# Patient Record
Sex: MALE | Race: WHITE | NOT HISPANIC OR LATINO
[De-identification: names, ages, dates, MRNs, and addresses within clinical notes are randomized per-mention and may not be internally consistent; named-entity substitution may affect disease eponyms.]

---

## 2017-01-10 ENCOUNTER — APPOINTMENT (OUTPATIENT)
Dept: INTERNAL MEDICINE | Facility: CLINIC | Age: 62
End: 2017-01-10

## 2017-01-10 VITALS
HEART RATE: 78 BPM | SYSTOLIC BLOOD PRESSURE: 100 MMHG | OXYGEN SATURATION: 98 % | BODY MASS INDEX: 28.49 KG/M2 | HEIGHT: 73 IN | WEIGHT: 215 LBS | DIASTOLIC BLOOD PRESSURE: 70 MMHG | TEMPERATURE: 98.1 F

## 2017-01-10 DIAGNOSIS — Z98.890 OTHER SPECIFIED POSTPROCEDURAL STATES: ICD-10-CM

## 2017-01-10 DIAGNOSIS — Z87.01 PERSONAL HISTORY OF PNEUMONIA (RECURRENT): ICD-10-CM

## 2017-01-10 DIAGNOSIS — R68.89 OTHER GENERAL SYMPTOMS AND SIGNS: ICD-10-CM

## 2017-01-21 ENCOUNTER — APPOINTMENT (OUTPATIENT)
Dept: RADIOLOGY | Facility: CLINIC | Age: 62
End: 2017-01-21

## 2017-01-21 ENCOUNTER — OUTPATIENT (OUTPATIENT)
Dept: OUTPATIENT SERVICES | Facility: HOSPITAL | Age: 62
LOS: 1 days | End: 2017-01-21
Payer: COMMERCIAL

## 2017-01-21 DIAGNOSIS — Z87.01 PERSONAL HISTORY OF PNEUMONIA (RECURRENT): ICD-10-CM

## 2017-01-21 DIAGNOSIS — Z98.52 VASECTOMY STATUS: Chronic | ICD-10-CM

## 2017-01-21 PROCEDURE — 71046 X-RAY EXAM CHEST 2 VIEWS: CPT

## 2017-02-06 ENCOUNTER — APPOINTMENT (OUTPATIENT)
Dept: OTOLARYNGOLOGY | Facility: CLINIC | Age: 62
End: 2017-02-06

## 2017-02-06 VITALS
HEIGHT: 73 IN | BODY MASS INDEX: 27.83 KG/M2 | DIASTOLIC BLOOD PRESSURE: 76 MMHG | HEART RATE: 64 BPM | WEIGHT: 210 LBS | SYSTOLIC BLOOD PRESSURE: 134 MMHG

## 2017-02-06 DIAGNOSIS — R09.82 POSTNASAL DRIP: ICD-10-CM

## 2017-02-06 DIAGNOSIS — J34.3 HYPERTROPHY OF NASAL TURBINATES: ICD-10-CM

## 2017-02-06 DIAGNOSIS — R05 COUGH: ICD-10-CM

## 2017-03-31 ENCOUNTER — NON-APPOINTMENT (OUTPATIENT)
Age: 62
End: 2017-03-31

## 2017-03-31 ENCOUNTER — APPOINTMENT (OUTPATIENT)
Dept: INTERNAL MEDICINE | Facility: CLINIC | Age: 62
End: 2017-03-31

## 2017-03-31 VITALS
HEIGHT: 73 IN | OXYGEN SATURATION: 98 % | SYSTOLIC BLOOD PRESSURE: 120 MMHG | HEART RATE: 67 BPM | TEMPERATURE: 98.3 F | BODY MASS INDEX: 28.49 KG/M2 | DIASTOLIC BLOOD PRESSURE: 70 MMHG | WEIGHT: 215 LBS

## 2017-03-31 DIAGNOSIS — R74.0 NONSPECIFIC ELEVATION OF LEVELS OF TRANSAMINASE AND LACTIC ACID DEHYDROGENASE [LDH]: ICD-10-CM

## 2017-03-31 DIAGNOSIS — Z23 ENCOUNTER FOR IMMUNIZATION: ICD-10-CM

## 2017-04-03 LAB
ALBUMIN SERPL ELPH-MCNC: 4.5 G/DL
ALP BLD-CCNC: 60 U/L
ALT SERPL-CCNC: 42 U/L
ANION GAP SERPL CALC-SCNC: 15 MMOL/L
APPEARANCE: CLEAR
AST SERPL-CCNC: 26 U/L
BASOPHILS # BLD AUTO: 0.03 K/UL
BASOPHILS NFR BLD AUTO: 0.5 %
BILIRUB SERPL-MCNC: 0.6 MG/DL
BILIRUBIN URINE: NEGATIVE
BLOOD URINE: NEGATIVE
BUN SERPL-MCNC: 15 MG/DL
CALCIUM SERPL-MCNC: 9.7 MG/DL
CHLORIDE SERPL-SCNC: 104 MMOL/L
CHOLEST SERPL-MCNC: 256 MG/DL
CHOLEST/HDLC SERPL: 8 RATIO
CO2 SERPL-SCNC: 24 MMOL/L
COLOR: YELLOW
CREAT SERPL-MCNC: 1.12 MG/DL
EOSINOPHIL # BLD AUTO: 0.13 K/UL
EOSINOPHIL NFR BLD AUTO: 2.2 %
GLUCOSE QUALITATIVE U: NORMAL MG/DL
GLUCOSE SERPL-MCNC: 91 MG/DL
HBA1C MFR BLD HPLC: 5.7 %
HCT VFR BLD CALC: 42.3 %
HDLC SERPL-MCNC: 32 MG/DL
HGB BLD-MCNC: 14.6 G/DL
IMM GRANULOCYTES NFR BLD AUTO: 0.3 %
KETONES URINE: NEGATIVE
LDLC SERPL CALC-MCNC: 165 MG/DL
LEUKOCYTE ESTERASE URINE: NEGATIVE
LYMPHOCYTES # BLD AUTO: 2.11 K/UL
LYMPHOCYTES NFR BLD AUTO: 34.9 %
MAN DIFF?: NORMAL
MCHC RBC-ENTMCNC: 30.9 PG
MCHC RBC-ENTMCNC: 34.5 GM/DL
MCV RBC AUTO: 89.4 FL
MONOCYTES # BLD AUTO: 0.48 K/UL
MONOCYTES NFR BLD AUTO: 7.9 %
NEUTROPHILS # BLD AUTO: 3.27 K/UL
NEUTROPHILS NFR BLD AUTO: 54.2 %
NITRITE URINE: NEGATIVE
PH URINE: 6
PLATELET # BLD AUTO: 203 K/UL
POTASSIUM SERPL-SCNC: 4.3 MMOL/L
PROT SERPL-MCNC: 7.5 G/DL
PROTEIN URINE: NEGATIVE MG/DL
PSA SERPL-MCNC: 0.48 NG/ML
RBC # BLD: 4.73 M/UL
RBC # FLD: 12.6 %
SODIUM SERPL-SCNC: 143 MMOL/L
SPECIFIC GRAVITY URINE: 1.02
T4 FREE SERPL-MCNC: 1 NG/DL
TRIGL SERPL-MCNC: 293 MG/DL
TSH SERPL-ACNC: 1.43 UIU/ML
UROBILINOGEN URINE: NORMAL MG/DL
WBC # FLD AUTO: 6.04 K/UL

## 2017-09-12 ENCOUNTER — RESULT REVIEW (OUTPATIENT)
Age: 62
End: 2017-09-12

## 2017-09-12 ENCOUNTER — LABORATORY RESULT (OUTPATIENT)
Age: 62
End: 2017-09-12

## 2017-09-12 ENCOUNTER — APPOINTMENT (OUTPATIENT)
Dept: DERMATOLOGY | Facility: CLINIC | Age: 62
End: 2017-09-12
Payer: COMMERCIAL

## 2017-09-12 VITALS — SYSTOLIC BLOOD PRESSURE: 112 MMHG | DIASTOLIC BLOOD PRESSURE: 68 MMHG

## 2017-09-12 DIAGNOSIS — L81.4 OTHER MELANIN HYPERPIGMENTATION: ICD-10-CM

## 2017-09-12 DIAGNOSIS — B07.9 VIRAL WART, UNSPECIFIED: ICD-10-CM

## 2017-09-12 PROCEDURE — 11100 BX SKIN SUBCUTANEOUS&/MUCOUS MEMBRANE 1 LESION: CPT | Mod: 59,GC

## 2017-09-12 PROCEDURE — 99213 OFFICE O/P EST LOW 20 MIN: CPT | Mod: 25

## 2017-09-12 PROCEDURE — 17110 DESTRUCTION B9 LES UP TO 14: CPT | Mod: GC

## 2017-09-15 ENCOUNTER — OTHER (OUTPATIENT)
Age: 62
End: 2017-09-15

## 2018-03-19 ENCOUNTER — NON-APPOINTMENT (OUTPATIENT)
Age: 63
End: 2018-03-19

## 2018-03-19 ENCOUNTER — APPOINTMENT (OUTPATIENT)
Dept: INTERNAL MEDICINE | Facility: CLINIC | Age: 63
End: 2018-03-19
Payer: COMMERCIAL

## 2018-03-19 ENCOUNTER — TRANSCRIPTION ENCOUNTER (OUTPATIENT)
Age: 63
End: 2018-03-19

## 2018-03-19 VITALS
HEART RATE: 70 BPM | TEMPERATURE: 98.1 F | HEIGHT: 71 IN | SYSTOLIC BLOOD PRESSURE: 100 MMHG | BODY MASS INDEX: 30.1 KG/M2 | WEIGHT: 215 LBS | OXYGEN SATURATION: 98 % | DIASTOLIC BLOOD PRESSURE: 70 MMHG

## 2018-03-19 PROCEDURE — 93000 ELECTROCARDIOGRAM COMPLETE: CPT

## 2018-03-19 PROCEDURE — 36415 COLL VENOUS BLD VENIPUNCTURE: CPT

## 2018-03-19 PROCEDURE — 99396 PREV VISIT EST AGE 40-64: CPT | Mod: 25

## 2018-03-20 ENCOUNTER — TRANSCRIPTION ENCOUNTER (OUTPATIENT)
Age: 63
End: 2018-03-20

## 2018-03-20 LAB
ALBUMIN SERPL ELPH-MCNC: 4.7 G/DL
ALP BLD-CCNC: 61 U/L
ALT SERPL-CCNC: 32 U/L
ANION GAP SERPL CALC-SCNC: 14 MMOL/L
APPEARANCE: CLEAR
AST SERPL-CCNC: 34 U/L
BASOPHILS # BLD AUTO: 0.02 K/UL
BASOPHILS NFR BLD AUTO: 0.3 %
BILIRUB SERPL-MCNC: 0.8 MG/DL
BILIRUBIN URINE: NEGATIVE
BLOOD URINE: NEGATIVE
BUN SERPL-MCNC: 19 MG/DL
CALCIUM SERPL-MCNC: 9.9 MG/DL
CHLORIDE SERPL-SCNC: 104 MMOL/L
CHOLEST SERPL-MCNC: 242 MG/DL
CHOLEST/HDLC SERPL: 6.1 RATIO
CO2 SERPL-SCNC: 23 MMOL/L
COLOR: YELLOW
CREAT SERPL-MCNC: 1.23 MG/DL
EOSINOPHIL # BLD AUTO: 0.11 K/UL
EOSINOPHIL NFR BLD AUTO: 1.9 %
GLUCOSE QUALITATIVE U: NEGATIVE MG/DL
GLUCOSE SERPL-MCNC: 98 MG/DL
HBA1C MFR BLD HPLC: 5.4 %
HCT VFR BLD CALC: 41.6 %
HDLC SERPL-MCNC: 40 MG/DL
HGB BLD-MCNC: 14.3 G/DL
IMM GRANULOCYTES NFR BLD AUTO: 0 %
KETONES URINE: NEGATIVE
LDLC SERPL CALC-MCNC: 170 MG/DL
LEUKOCYTE ESTERASE URINE: NEGATIVE
LYMPHOCYTES # BLD AUTO: 1.76 K/UL
LYMPHOCYTES NFR BLD AUTO: 30.6 %
MAN DIFF?: NORMAL
MCHC RBC-ENTMCNC: 32.1 PG
MCHC RBC-ENTMCNC: 34.4 GM/DL
MCV RBC AUTO: 93.3 FL
MONOCYTES # BLD AUTO: 0.54 K/UL
MONOCYTES NFR BLD AUTO: 9.4 %
NEUTROPHILS # BLD AUTO: 3.33 K/UL
NEUTROPHILS NFR BLD AUTO: 57.8 %
NITRITE URINE: NEGATIVE
PH URINE: 6
PLATELET # BLD AUTO: 195 K/UL
POTASSIUM SERPL-SCNC: 4.3 MMOL/L
PROT SERPL-MCNC: 7.8 G/DL
PROTEIN URINE: NEGATIVE MG/DL
PSA SERPL-MCNC: 0.47 NG/ML
RBC # BLD: 4.46 M/UL
RBC # FLD: 12.6 %
SODIUM SERPL-SCNC: 141 MMOL/L
SPECIFIC GRAVITY URINE: 1.02
T4 FREE SERPL-MCNC: 1 NG/DL
TRIGL SERPL-MCNC: 161 MG/DL
TSH SERPL-ACNC: 1.47 UIU/ML
UROBILINOGEN URINE: NEGATIVE MG/DL
WBC # FLD AUTO: 5.76 K/UL

## 2018-03-21 ENCOUNTER — TRANSCRIPTION ENCOUNTER (OUTPATIENT)
Age: 63
End: 2018-03-21

## 2018-08-01 ENCOUNTER — TRANSCRIPTION ENCOUNTER (OUTPATIENT)
Age: 63
End: 2018-08-01

## 2018-08-13 ENCOUNTER — APPOINTMENT (OUTPATIENT)
Dept: INTERNAL MEDICINE | Facility: CLINIC | Age: 63
End: 2018-08-13
Payer: COMMERCIAL

## 2018-08-13 VITALS
WEIGHT: 198 LBS | HEART RATE: 60 BPM | OXYGEN SATURATION: 99 % | HEIGHT: 71 IN | BODY MASS INDEX: 27.72 KG/M2 | SYSTOLIC BLOOD PRESSURE: 100 MMHG | DIASTOLIC BLOOD PRESSURE: 70 MMHG | TEMPERATURE: 98.2 F

## 2018-08-13 DIAGNOSIS — R31.9 HEMATURIA, UNSPECIFIED: ICD-10-CM

## 2018-08-13 PROCEDURE — 36415 COLL VENOUS BLD VENIPUNCTURE: CPT

## 2018-08-13 PROCEDURE — 99214 OFFICE O/P EST MOD 30 MIN: CPT | Mod: 25

## 2018-08-13 RX ORDER — METHYLPREDNISOLONE 4 MG/1
4 TABLET ORAL
Qty: 1 | Refills: 0 | Status: DISCONTINUED | COMMUNITY
Start: 2017-02-06 | End: 2018-08-13

## 2018-08-13 NOTE — PHYSICAL EXAM
[No Acute Distress] : no acute distress [Well-Appearing] : well-appearing [Normal Voice/Communication] : normal voice/communication [Soft] : abdomen soft [Non Tender] : non-tender [Non-distended] : non-distended [No HSM] : no HSM [Normal Bowel Sounds] : normal bowel sounds [No CVA Tenderness] : no CVA  tenderness [Grossly Normal Strength/Tone] : grossly normal strength/tone [Normal Gait] : normal gait [Normal Affect] : the affect was normal [Normal Mood] : the mood was normal [Normal Insight/Judgement] : insight and judgment were intact

## 2018-08-13 NOTE — HISTORY OF PRESENT ILLNESS
[de-identified] : presents for f/u visit after episode of brief gross hematuria , followed by difficulty passing urine briefly one morning, gross hematuria , then he passed what he thought could be a stone in his urine. he had no significant pain prior to this. no recurrence of any urinary symptoms over past 1-2 weeks, feels well, no flank pain. he recalls a history of incidental small kidney stones found on an abdominal US 10 years ago, but never had an issue until now. he feels well currently, w no abd pain or urinary complaints. \par \par hyperlipidemia on statin with no adverse effects, due for recheck of labs. he has been exercising and continues to lose weight ,feels well

## 2018-08-13 NOTE — REVIEW OF SYSTEMS
[Negative] : Heme/Lymph [Dysuria] : no dysuria [Incontinence] : no incontinence [Hesitancy] : no hesitancy [Hematuria] : no hematuria [Frequency] : no frequency

## 2018-08-13 NOTE — ASSESSMENT
[FreeTextEntry1] : discussed w pt \par reviewed his episode of gross hematuria and likely passing of a small stone few weeks ago, he is aware of prior asymptomatic stones on imaging, from years ago, found incidentally. \par advised increase oral fluids\par check UA, culture, urine cytology as precaution\par check renal US to further evaluate if any stones present \par consider urology eval if any recurrent symptoms. he declines for now \par \par repeat labs as below for lipid panel , COMP, cont statin \par \par RTO few weeks for f/u after above studies or call earlier prn if any new concerns

## 2018-08-14 LAB
APPEARANCE: CLEAR
BACTERIA: NEGATIVE
BILIRUBIN URINE: NEGATIVE
BLOOD URINE: NEGATIVE
COLOR: YELLOW
GLUCOSE QUALITATIVE U: NEGATIVE MG/DL
KETONES URINE: NEGATIVE
LEUKOCYTE ESTERASE URINE: NEGATIVE
MICROSCOPIC-UA: NORMAL
NITRITE URINE: NEGATIVE
PH URINE: 5.5
PROTEIN URINE: NEGATIVE MG/DL
RED BLOOD CELLS URINE: 0 /HPF
SPECIFIC GRAVITY URINE: 1.02
SQUAMOUS EPITHELIAL CELLS: 0 /HPF
UROBILINOGEN URINE: NEGATIVE MG/DL
WHITE BLOOD CELLS URINE: 0 /HPF

## 2018-08-16 LAB
ALBUMIN SERPL ELPH-MCNC: 4.9 G/DL
ALP BLD-CCNC: 57 U/L
ALT SERPL-CCNC: 27 U/L
ANION GAP SERPL CALC-SCNC: 13 MMOL/L
AST SERPL-CCNC: 35 U/L
BACTERIA UR CULT: NORMAL
BILIRUB SERPL-MCNC: 0.7 MG/DL
BUN SERPL-MCNC: 15 MG/DL
CALCIUM SERPL-MCNC: 9.8 MG/DL
CHLORIDE SERPL-SCNC: 104 MMOL/L
CHOLEST SERPL-MCNC: 137 MG/DL
CHOLEST/HDLC SERPL: 3 RATIO
CO2 SERPL-SCNC: 25 MMOL/L
CORE LAB FLUID CYTOLOGY: NORMAL
CREAT SERPL-MCNC: 0.96 MG/DL
GLUCOSE SERPL-MCNC: 89 MG/DL
HDLC SERPL-MCNC: 46 MG/DL
LDLC SERPL CALC-MCNC: 77 MG/DL
POTASSIUM SERPL-SCNC: 4 MMOL/L
PROT SERPL-MCNC: 7.6 G/DL
SODIUM SERPL-SCNC: 142 MMOL/L
TRIGL SERPL-MCNC: 72 MG/DL

## 2018-12-04 ENCOUNTER — RX RENEWAL (OUTPATIENT)
Age: 63
End: 2018-12-04

## 2019-03-11 ENCOUNTER — TRANSCRIPTION ENCOUNTER (OUTPATIENT)
Age: 64
End: 2019-03-11

## 2019-03-27 ENCOUNTER — NON-APPOINTMENT (OUTPATIENT)
Age: 64
End: 2019-03-27

## 2019-03-27 ENCOUNTER — APPOINTMENT (OUTPATIENT)
Dept: INTERNAL MEDICINE | Facility: CLINIC | Age: 64
End: 2019-03-27
Payer: COMMERCIAL

## 2019-03-27 ENCOUNTER — LABORATORY RESULT (OUTPATIENT)
Age: 64
End: 2019-03-27

## 2019-03-27 VITALS
HEART RATE: 64 BPM | OXYGEN SATURATION: 99 % | SYSTOLIC BLOOD PRESSURE: 110 MMHG | DIASTOLIC BLOOD PRESSURE: 60 MMHG | TEMPERATURE: 98.7 F | WEIGHT: 194 LBS | HEIGHT: 71 IN | BODY MASS INDEX: 27.16 KG/M2

## 2019-03-27 DIAGNOSIS — Z23 ENCOUNTER FOR IMMUNIZATION: ICD-10-CM

## 2019-03-27 DIAGNOSIS — H61.23 IMPACTED CERUMEN, BILATERAL: ICD-10-CM

## 2019-03-27 PROCEDURE — 99396 PREV VISIT EST AGE 40-64: CPT | Mod: 25

## 2019-03-27 PROCEDURE — 90471 IMMUNIZATION ADMIN: CPT

## 2019-03-27 PROCEDURE — 36415 COLL VENOUS BLD VENIPUNCTURE: CPT

## 2019-03-27 PROCEDURE — 90715 TDAP VACCINE 7 YRS/> IM: CPT

## 2019-03-27 PROCEDURE — G0444 DEPRESSION SCREEN ANNUAL: CPT | Mod: 25

## 2019-03-27 PROCEDURE — 93000 ELECTROCARDIOGRAM COMPLETE: CPT

## 2019-03-27 NOTE — HEALTH RISK ASSESSMENT
[Good] : ~his/her~ current health as good [Patient reported colonoscopy was normal] : Patient reported colonoscopy was normal [None] : None [] :  [] : No [No falls in past year] : Patient reported no falls in the past year [0] : 2) Feeling down, depressed, or hopeless: Not at all (0) [ColonoscopyDate] : 03/14 [ColonoscopyComments] : - as per pt recollection, was not able to obtain records

## 2019-03-27 NOTE — REVIEW OF SYSTEMS
[Negative] : Heme/Lymph [Mole Changes] : no mole changes [de-identified] : b/l inner ear dermatitis unchanged

## 2019-03-27 NOTE — HISTORY OF PRESENT ILLNESS
[FreeTextEntry1] : \par 62 y/o man presents for annual physical exam. he feels well currently w no new concerns. exercising regularly, playing basketball, lost more weight, diet improved. \par \par hyperlipidemia nicely improved on low dose statin \par \par follows w dermatology yearly for multiple skin lesions, no skin cancer detected  \par \par had 3 prior colonoscopies in his 50s at the direction of his PCP/GI Dr Blake, all normal as per pt and last done in ~2014

## 2019-03-27 NOTE — PHYSICAL EXAM
[No Acute Distress] : no acute distress [Well Nourished] : well nourished [Well Developed] : well developed [Well-Appearing] : well-appearing [Normal Sclera/Conjunctiva] : normal sclera/conjunctiva [PERRL] : pupils equal round and reactive to light [EOMI] : extraocular movements intact [Normal Outer Ear/Nose] : the outer ears and nose were normal in appearance [Normal Oropharynx] : the oropharynx was normal [No JVD] : no jugular venous distention [Supple] : supple [No Lymphadenopathy] : no lymphadenopathy [Thyroid Normal, No Nodules] : the thyroid was normal and there were no nodules present [No Respiratory Distress] : no respiratory distress  [Clear to Auscultation] : lungs were clear to auscultation bilaterally [No Accessory Muscle Use] : no accessory muscle use [Normal Rate] : normal rate  [Regular Rhythm] : with a regular rhythm [Normal S1, S2] : normal S1 and S2 [No Murmur] : no murmur heard [No Carotid Bruits] : no carotid bruits [No Abdominal Bruit] : a ~M bruit was not heard ~T in the abdomen [No Varicosities] : no varicosities [Pedal Pulses Present] : the pedal pulses are present [No Edema] : there was no peripheral edema [No Extremity Clubbing/Cyanosis] : no extremity clubbing/cyanosis [No Palpable Aorta] : no palpable aorta [Soft] : abdomen soft [Non Tender] : non-tender [Non-distended] : non-distended [No Masses] : no abdominal mass palpated [No HSM] : no HSM [Normal Bowel Sounds] : normal bowel sounds [Normal Sphincter Tone] : normal sphincter tone [No Mass] : no mass [Normal Supraclavicular Nodes] : no supraclavicular lymphadenopathy [Normal Posterior Cervical Nodes] : no posterior cervical lymphadenopathy [Normal Anterior Cervical Nodes] : no anterior cervical lymphadenopathy [No CVA Tenderness] : no CVA  tenderness [No Spinal Tenderness] : no spinal tenderness [No Joint Swelling] : no joint swelling [Grossly Normal Strength/Tone] : grossly normal strength/tone [No Rash] : no rash [Normal Gait] : normal gait [Coordination Grossly Intact] : coordination grossly intact [No Focal Deficits] : no focal deficits [Deep Tendon Reflexes (DTR)] : deep tendon reflexes were 2+ and symmetric [Normal Affect] : the affect was normal [Normal Mood] : the mood was normal [Normal Insight/Judgement] : insight and judgment were intact [Speech Grossly Normal] : speech grossly normal [de-identified] : b/l cerumen impaction  [de-identified] : b/l ear dermatitis mild, multiple skin lesions on back, chest unchanged

## 2019-03-27 NOTE — ASSESSMENT
[FreeTextEntry1] : discussed w pt \par \par check routine labs as below\par \par cont statin , lipids much improved thus far \par \par cont diet control, exercise, weight control \par \par dermatology f/u yearly \par \par he is UTD w screening colonoscopy, last done 4-5 years ago , consider repeat next year \par \par Tdap discussed and administered today. consider Shingrix vaccine in the future \par \par RTO 6 months for routine f/u or earlier prn if any new concerns  \par

## 2019-04-08 LAB
ALBUMIN SERPL ELPH-MCNC: 5.1 G/DL
ALP BLD-CCNC: 63 U/L
ALT SERPL-CCNC: 35 U/L
ANION GAP SERPL CALC-SCNC: 12 MMOL/L
APPEARANCE: CLEAR
AST SERPL-CCNC: 25 U/L
BASOPHILS # BLD AUTO: 0.02 K/UL
BASOPHILS NFR BLD AUTO: 0.3 %
BILIRUB SERPL-MCNC: 1 MG/DL
BILIRUBIN URINE: NEGATIVE
BLOOD URINE: ABNORMAL
BUN SERPL-MCNC: 19 MG/DL
CALCIUM SERPL-MCNC: 10 MG/DL
CHLORIDE SERPL-SCNC: 103 MMOL/L
CHOLEST SERPL-MCNC: 151 MG/DL
CHOLEST/HDLC SERPL: 3.8 RATIO
CO2 SERPL-SCNC: 27 MMOL/L
COLOR: YELLOW
CREAT SERPL-MCNC: 1.14 MG/DL
EOSINOPHIL # BLD AUTO: 0.06 K/UL
EOSINOPHIL NFR BLD AUTO: 0.9 %
ESTIMATED AVERAGE GLUCOSE: 108 MG/DL
GLUCOSE QUALITATIVE U: NEGATIVE
GLUCOSE SERPL-MCNC: 101 MG/DL
HBA1C MFR BLD HPLC: 5.4 %
HCT VFR BLD CALC: 41.4 %
HDLC SERPL-MCNC: 40 MG/DL
HGB BLD-MCNC: 13.9 G/DL
IMM GRANULOCYTES NFR BLD AUTO: 0.3 %
KETONES URINE: NEGATIVE
LDLC SERPL CALC-MCNC: 87 MG/DL
LEUKOCYTE ESTERASE URINE: NEGATIVE
LYMPHOCYTES # BLD AUTO: 1.74 K/UL
LYMPHOCYTES NFR BLD AUTO: 25.5 %
MAN DIFF?: NORMAL
MCHC RBC-ENTMCNC: 30.6 PG
MCHC RBC-ENTMCNC: 33.6 GM/DL
MCV RBC AUTO: 91.2 FL
MONOCYTES # BLD AUTO: 0.54 K/UL
MONOCYTES NFR BLD AUTO: 7.9 %
NEUTROPHILS # BLD AUTO: 4.45 K/UL
NEUTROPHILS NFR BLD AUTO: 65.1 %
NITRITE URINE: NEGATIVE
PH URINE: 6
PLATELET # BLD AUTO: 198 K/UL
POTASSIUM SERPL-SCNC: 4.8 MMOL/L
PROT SERPL-MCNC: 7.4 G/DL
PROTEIN URINE: NEGATIVE
PSA SERPL-MCNC: 0.59 NG/ML
RBC # BLD: 4.54 M/UL
RBC # FLD: 12.1 %
SODIUM SERPL-SCNC: 142 MMOL/L
SPECIFIC GRAVITY URINE: 1.02
T4 FREE SERPL-MCNC: 1 NG/DL
TRIGL SERPL-MCNC: 122 MG/DL
TSH SERPL-ACNC: 1.34 UIU/ML
UROBILINOGEN URINE: NORMAL
WBC # FLD AUTO: 6.83 K/UL

## 2019-05-13 ENCOUNTER — TRANSCRIPTION ENCOUNTER (OUTPATIENT)
Age: 64
End: 2019-05-13

## 2019-05-23 ENCOUNTER — APPOINTMENT (OUTPATIENT)
Dept: DERMATOLOGY | Facility: CLINIC | Age: 64
End: 2019-05-23
Payer: COMMERCIAL

## 2019-05-23 VITALS — SYSTOLIC BLOOD PRESSURE: 130 MMHG | DIASTOLIC BLOOD PRESSURE: 60 MMHG

## 2019-05-23 DIAGNOSIS — D17.1 BENIGN LIPOMATOUS NEOPLASM OF SKIN AND SUBCUTANEOUS TISSUE OF TRUNK: ICD-10-CM

## 2019-05-23 PROCEDURE — 99214 OFFICE O/P EST MOD 30 MIN: CPT

## 2019-06-10 ENCOUNTER — APPOINTMENT (OUTPATIENT)
Dept: DERMATOLOGY | Facility: CLINIC | Age: 64
End: 2019-06-10

## 2019-07-02 ENCOUNTER — RX RENEWAL (OUTPATIENT)
Age: 64
End: 2019-07-02

## 2019-07-05 ENCOUNTER — RX RENEWAL (OUTPATIENT)
Age: 64
End: 2019-07-05

## 2019-08-08 ENCOUNTER — RX RENEWAL (OUTPATIENT)
Age: 64
End: 2019-08-08

## 2019-12-24 ENCOUNTER — TRANSCRIPTION ENCOUNTER (OUTPATIENT)
Age: 64
End: 2019-12-24

## 2019-12-24 ENCOUNTER — MEDICATION RENEWAL (OUTPATIENT)
Age: 64
End: 2019-12-24

## 2020-01-08 ENCOUNTER — TRANSCRIPTION ENCOUNTER (OUTPATIENT)
Age: 65
End: 2020-01-08

## 2020-04-01 ENCOUNTER — APPOINTMENT (OUTPATIENT)
Dept: INTERNAL MEDICINE | Facility: CLINIC | Age: 65
End: 2020-04-01

## 2020-05-28 ENCOUNTER — APPOINTMENT (OUTPATIENT)
Dept: INTERNAL MEDICINE | Facility: CLINIC | Age: 65
End: 2020-05-28
Payer: COMMERCIAL

## 2020-05-28 ENCOUNTER — NON-APPOINTMENT (OUTPATIENT)
Age: 65
End: 2020-05-28

## 2020-05-28 VITALS
DIASTOLIC BLOOD PRESSURE: 65 MMHG | HEART RATE: 86 BPM | OXYGEN SATURATION: 97 % | SYSTOLIC BLOOD PRESSURE: 130 MMHG | BODY MASS INDEX: 27.72 KG/M2 | TEMPERATURE: 98.2 F | WEIGHT: 198 LBS | HEIGHT: 71 IN

## 2020-05-28 DIAGNOSIS — Z11.59 ENCOUNTER FOR SCREENING FOR OTHER VIRAL DISEASES: ICD-10-CM

## 2020-05-28 PROCEDURE — 90750 HZV VACC RECOMBINANT IM: CPT

## 2020-05-28 PROCEDURE — 93000 ELECTROCARDIOGRAM COMPLETE: CPT | Mod: 59

## 2020-05-28 PROCEDURE — 36415 COLL VENOUS BLD VENIPUNCTURE: CPT

## 2020-05-28 PROCEDURE — G0444 DEPRESSION SCREEN ANNUAL: CPT

## 2020-05-28 PROCEDURE — 99396 PREV VISIT EST AGE 40-64: CPT | Mod: 25

## 2020-05-28 PROCEDURE — 90471 IMMUNIZATION ADMIN: CPT

## 2020-05-28 NOTE — PHYSICAL EXAM
[No Acute Distress] : no acute distress [Well Nourished] : well nourished [Well Developed] : well developed [Well-Appearing] : well-appearing [Normal Voice/Communication] : normal voice/communication [Normal Sclera/Conjunctiva] : normal sclera/conjunctiva [PERRL] : pupils equal round and reactive to light [Normal Outer Ear/Nose] : the outer ears and nose were normal in appearance [Normal Oropharynx] : the oropharynx was normal [Normal TMs] : both tympanic membranes were normal [No JVD] : no jugular venous distention [Supple] : supple [No Lymphadenopathy] : no lymphadenopathy [Thyroid Normal, No Nodules] : the thyroid was normal and there were no nodules present [No Respiratory Distress] : no respiratory distress  [Clear to Auscultation] : lungs were clear to auscultation bilaterally [No Accessory Muscle Use] : no accessory muscle use [Normal Rate] : normal rate  [Regular Rhythm] : with a regular rhythm [Normal S1, S2] : normal S1 and S2 [No Murmur] : no murmur heard [No Carotid Bruits] : no carotid bruits [No Abdominal Bruit] : a ~M bruit was not heard ~T in the abdomen [No Varicosities] : no varicosities [Pedal Pulses Present] : the pedal pulses are present [No Edema] : there was no peripheral edema [No Extremity Clubbing/Cyanosis] : no extremity clubbing/cyanosis [No Palpable Aorta] : no palpable aorta [Soft] : abdomen soft [Non-distended] : non-distended [Non Tender] : non-tender [No HSM] : no HSM [No Masses] : no abdominal mass palpated [Normal Sphincter Tone] : normal sphincter tone [Normal Bowel Sounds] : normal bowel sounds [No Mass] : no mass [Prostate Enlargement] : the prostate was not enlarged [No Prostate Nodules] : no prostate nodules [Prostate Tenderness] : the prostate was not tender [Normal Posterior Cervical Nodes] : no posterior cervical lymphadenopathy [Normal Supraclavicular Nodes] : no supraclavicular lymphadenopathy [No CVA Tenderness] : no CVA  tenderness [No Spinal Tenderness] : no spinal tenderness [Normal Anterior Cervical Nodes] : no anterior cervical lymphadenopathy [No Joint Swelling] : no joint swelling [Grossly Normal Strength/Tone] : grossly normal strength/tone [No Rash] : no rash [Normal Gait] : normal gait [Coordination Grossly Intact] : coordination grossly intact [No Focal Deficits] : no focal deficits [Deep Tendon Reflexes (DTR)] : deep tendon reflexes were 2+ and symmetric [Speech Grossly Normal] : speech grossly normal [Normal Affect] : the affect was normal [Normal Insight/Judgement] : insight and judgment were intact [Normal Mood] : the mood was normal [de-identified] : b/l ear dermatitis mild, multiple skin lesions on back, chest unchanged  [de-identified] : minimal b/l cerumen, improved

## 2020-05-28 NOTE — HISTORY OF PRESENT ILLNESS
[FreeTextEntry1] : \par 65 y/o man presents for annual physical exam. he feels well currently w no new concerns. has not been able to exercise oftenor play basketball due to current COVID19 pandemic, but he is trying to remain active.\par \par hyperlipidemia well controlled on low dose statin \par \par follows w dermatology yearly for multiple skin lesions, no skin cancer detected  \par \par he is due for repeat screening colonoscopy. had 3 prior colonoscopies in his 50s at the direction of his PCP/GI Dr Blake, all normal as per pt and last done in ~2014

## 2020-05-28 NOTE — REVIEW OF SYSTEMS
[Mole Changes] : no mole changes [Negative] : Heme/Lymph [de-identified] : b/l inner ear dermatitis unchanged

## 2020-05-28 NOTE — HEALTH RISK ASSESSMENT
[] : No [No] : No [No falls in past year] : Patient reported no falls in the past year [0] : 2) Feeling down, depressed, or hopeless: Not at all (0) [Patient reported colonoscopy was normal] : Patient reported colonoscopy was normal [None] : None [] :  [ColonoscopyDate] : 03/14 [ColonoscopyComments] : - as per pt recollection, was not able to obtain records

## 2020-05-28 NOTE — ASSESSMENT
[FreeTextEntry1] : discussed w pt \par \par check routine labs as below\par \par cont statin , monitor lipids , improved \par \par cont diet control, exercise, weight control \par \par dermatology f/u yearly \par \par due for screening colonoscopy, referred to GI \par \par discussed Shingrix vaccine in detail, first dose administered today \par \par he mentions concern re 'curved penis' for past 5-6 months. no pain or problem w urination or ejaculation, but sometimes interferes w sexual intercourse. no discharge or rash. reviewed and advised this may represent Peyronies disease. advised urology consult to review potential tx options, he will make appt \par \par RTO yearly for routine exam or earlier prn if any new concerns  \par

## 2020-06-16 LAB
ALBUMIN SERPL ELPH-MCNC: 5.3 G/DL
ALP BLD-CCNC: 69 U/L
ALT SERPL-CCNC: 28 U/L
ANION GAP SERPL CALC-SCNC: 13 MMOL/L
APPEARANCE: CLEAR
AST SERPL-CCNC: 25 U/L
BASOPHILS # BLD AUTO: 0.02 K/UL
BASOPHILS NFR BLD AUTO: 0.3 %
BILIRUB SERPL-MCNC: 0.8 MG/DL
BILIRUBIN URINE: NEGATIVE
BLOOD URINE: NEGATIVE
BUN SERPL-MCNC: 18 MG/DL
CALCIUM SERPL-MCNC: 10.1 MG/DL
CHLORIDE SERPL-SCNC: 103 MMOL/L
CHOLEST SERPL-MCNC: 212 MG/DL
CHOLEST/HDLC SERPL: 5.6 RATIO
CO2 SERPL-SCNC: 28 MMOL/L
COLOR: YELLOW
CREAT SERPL-MCNC: 1.14 MG/DL
EOSINOPHIL # BLD AUTO: 0.08 K/UL
EOSINOPHIL NFR BLD AUTO: 1 %
ESTIMATED AVERAGE GLUCOSE: 111 MG/DL
GLUCOSE QUALITATIVE U: NEGATIVE
GLUCOSE SERPL-MCNC: 104 MG/DL
HBA1C MFR BLD HPLC: 5.5 %
HCT VFR BLD CALC: 43.7 %
HDLC SERPL-MCNC: 38 MG/DL
HGB BLD-MCNC: 14.7 G/DL
IMM GRANULOCYTES NFR BLD AUTO: 0.3 %
KETONES URINE: NEGATIVE
LDLC SERPL CALC-MCNC: 109 MG/DL
LEUKOCYTE ESTERASE URINE: NEGATIVE
LYMPHOCYTES # BLD AUTO: 2.16 K/UL
LYMPHOCYTES NFR BLD AUTO: 28.3 %
MAN DIFF?: NORMAL
MCHC RBC-ENTMCNC: 30.9 PG
MCHC RBC-ENTMCNC: 33.6 GM/DL
MCV RBC AUTO: 91.8 FL
MONOCYTES # BLD AUTO: 0.59 K/UL
MONOCYTES NFR BLD AUTO: 7.7 %
NEUTROPHILS # BLD AUTO: 4.76 K/UL
NEUTROPHILS NFR BLD AUTO: 62.4 %
NITRITE URINE: NEGATIVE
PH URINE: 5.5
PLATELET # BLD AUTO: 204 K/UL
POTASSIUM SERPL-SCNC: 4.4 MMOL/L
PROT SERPL-MCNC: 7.9 G/DL
PROTEIN URINE: NEGATIVE
PSA SERPL-MCNC: 0.66 NG/ML
RBC # BLD: 4.76 M/UL
RBC # FLD: 12 %
SARS-COV-2 IGG SERPL IA-ACNC: <0.1 INDEX
SARS-COV-2 IGG SERPL QL IA: NEGATIVE
SODIUM SERPL-SCNC: 144 MMOL/L
SPECIFIC GRAVITY URINE: 1.02
T4 FREE SERPL-MCNC: 1 NG/DL
TRIGL SERPL-MCNC: 325 MG/DL
TSH SERPL-ACNC: 2.15 UIU/ML
UROBILINOGEN URINE: NORMAL
WBC # FLD AUTO: 7.63 K/UL

## 2020-06-25 ENCOUNTER — APPOINTMENT (OUTPATIENT)
Dept: GASTROENTEROLOGY | Facility: CLINIC | Age: 65
End: 2020-06-25
Payer: COMMERCIAL

## 2020-06-25 VITALS
HEIGHT: 71 IN | WEIGHT: 206 LBS | OXYGEN SATURATION: 96 % | SYSTOLIC BLOOD PRESSURE: 120 MMHG | BODY MASS INDEX: 28.84 KG/M2 | HEART RATE: 70 BPM | DIASTOLIC BLOOD PRESSURE: 69 MMHG

## 2020-06-25 DIAGNOSIS — Z12.12 ENCOUNTER FOR SCREENING FOR MALIGNANT NEOPLASM OF COLON: ICD-10-CM

## 2020-06-25 DIAGNOSIS — Z12.11 ENCOUNTER FOR SCREENING FOR MALIGNANT NEOPLASM OF COLON: ICD-10-CM

## 2020-06-25 PROCEDURE — 99214 OFFICE O/P EST MOD 30 MIN: CPT

## 2020-06-25 NOTE — PHYSICAL EXAM
[General Appearance - Alert] : alert [General Appearance - In No Acute Distress] : in no acute distress [Sclera] : the sclera and conjunctiva were normal [PERRL With Normal Accommodation] : pupils were equal in size, round, and reactive to light [Oropharynx] : the oropharynx was normal [Outer Ear] : the ears and nose were normal in appearance [Extraocular Movements] : extraocular movements were intact [Neck Appearance] : the appearance of the neck was normal [Jugular Venous Distention Increased] : there was no jugular-venous distention [Neck Cervical Mass (___cm)] : no neck mass was observed [Thyroid Diffuse Enlargement] : the thyroid was not enlarged [Thyroid Nodule] : there were no palpable thyroid nodules [Heart Rate And Rhythm] : heart rate was normal and rhythm regular [Auscultation Breath Sounds / Voice Sounds] : lungs were clear to auscultation bilaterally [Heart Sounds Gallop] : no gallops [Heart Sounds] : normal S1 and S2 [Murmurs] : no murmurs [Bowel Sounds] : normal bowel sounds [Edema] : there was no peripheral edema [Heart Sounds Pericardial Friction Rub] : no pericardial rub [Abdomen Soft] : soft [Abdomen Tenderness] : non-tender [Cervical Lymph Nodes Enlarged Posterior Bilaterally] : posterior cervical [Cervical Lymph Nodes Enlarged Anterior Bilaterally] : anterior cervical [Abdomen Mass (___ Cm)] : no abdominal mass palpated [No Spinal Tenderness] : no spinal tenderness [No CVA Tenderness] : no ~M costovertebral angle tenderness [Abnormal Walk] : normal gait [Musculoskeletal - Swelling] : no joint swelling seen [Nail Clubbing] : no clubbing  or cyanosis of the fingernails [Motor Tone] : muscle strength and tone were normal [Skin Color & Pigmentation] : normal skin color and pigmentation [Skin Turgor] : normal skin turgor [] : no rash [Oriented To Time, Place, And Person] : oriented to person, place, and time [No Focal Deficits] : no focal deficits [Affect] : the affect was normal [Impaired Insight] : insight and judgment were intact

## 2020-06-25 NOTE — ASSESSMENT
[FreeTextEntry1] : Pt is due for a screening colonoscopy.\par \par I have asked the patient to schedule a colonoscopy in the near future. I have reviewed the risks benefits and alternatives and provided the patient literature to read.  I have emphasized the need to have a good clean out including adequate fluid intake and avoiding seeds for one week prior to the procedure.

## 2020-06-25 NOTE — HISTORY OF PRESENT ILLNESS
[de-identified] : The patient moves bowels daily. Denies any abdominal pain, constipation, diarrhea, bright red blood per rectum. Weight is stable.\par \par No heartburn, odynophagia, dysphagia or early satiety.\par \par No history of anemia or abnormal liver enzymes.\par \par Due for screening colonoscopy.

## 2020-09-13 DIAGNOSIS — Z01.818 ENCOUNTER FOR OTHER PREPROCEDURAL EXAMINATION: ICD-10-CM

## 2020-09-15 ENCOUNTER — APPOINTMENT (OUTPATIENT)
Dept: DISASTER EMERGENCY | Facility: CLINIC | Age: 65
End: 2020-09-15

## 2020-09-15 LAB — SARS-COV-2 N GENE NPH QL NAA+PROBE: NOT DETECTED

## 2020-09-17 ENCOUNTER — TRANSCRIPTION ENCOUNTER (OUTPATIENT)
Age: 65
End: 2020-09-17

## 2020-09-18 ENCOUNTER — APPOINTMENT (OUTPATIENT)
Dept: GASTROENTEROLOGY | Facility: HOSPITAL | Age: 65
End: 2020-09-18

## 2020-09-18 ENCOUNTER — OUTPATIENT (OUTPATIENT)
Dept: OUTPATIENT SERVICES | Facility: HOSPITAL | Age: 65
LOS: 1 days | End: 2020-09-18
Payer: COMMERCIAL

## 2020-09-18 ENCOUNTER — RESULT REVIEW (OUTPATIENT)
Age: 65
End: 2020-09-18

## 2020-09-18 ENCOUNTER — APPOINTMENT (OUTPATIENT)
Dept: INTERNAL MEDICINE | Facility: CLINIC | Age: 65
End: 2020-09-18
Payer: COMMERCIAL

## 2020-09-18 DIAGNOSIS — Z23 ENCOUNTER FOR IMMUNIZATION: ICD-10-CM

## 2020-09-18 DIAGNOSIS — Z12.11 ENCOUNTER FOR SCREENING FOR MALIGNANT NEOPLASM OF COLON: ICD-10-CM

## 2020-09-18 DIAGNOSIS — Z98.52 VASECTOMY STATUS: Chronic | ICD-10-CM

## 2020-09-18 PROCEDURE — 88305 TISSUE EXAM BY PATHOLOGIST: CPT | Mod: 26

## 2020-09-18 PROCEDURE — 45380 COLONOSCOPY AND BIOPSY: CPT | Mod: XS,PT

## 2020-09-18 PROCEDURE — 88305 TISSUE EXAM BY PATHOLOGIST: CPT

## 2020-09-18 PROCEDURE — 45385 COLONOSCOPY W/LESION REMOVAL: CPT | Mod: 33

## 2020-09-18 PROCEDURE — 45385 COLONOSCOPY W/LESION REMOVAL: CPT | Mod: PT

## 2020-09-18 PROCEDURE — 90471 IMMUNIZATION ADMIN: CPT

## 2020-09-18 PROCEDURE — 90750 HZV VACC RECOMBINANT IM: CPT

## 2020-09-18 PROCEDURE — 45380 COLONOSCOPY AND BIOPSY: CPT | Mod: 59

## 2020-09-21 LAB — SURGICAL PATHOLOGY STUDY: SIGNIFICANT CHANGE UP

## 2020-10-14 ENCOUNTER — APPOINTMENT (OUTPATIENT)
Dept: UROLOGY | Facility: CLINIC | Age: 65
End: 2020-10-14
Payer: COMMERCIAL

## 2020-10-14 VITALS
HEART RATE: 70 BPM | OXYGEN SATURATION: 98 % | TEMPERATURE: 97.8 F | HEIGHT: 71 IN | WEIGHT: 200 LBS | DIASTOLIC BLOOD PRESSURE: 82 MMHG | BODY MASS INDEX: 28 KG/M2 | SYSTOLIC BLOOD PRESSURE: 132 MMHG

## 2020-10-14 DIAGNOSIS — R39.198 OTHER DIFFICULTIES WITH MICTURITION: ICD-10-CM

## 2020-10-14 PROCEDURE — 99203 OFFICE O/P NEW LOW 30 MIN: CPT | Mod: 25

## 2020-10-14 PROCEDURE — 51798 US URINE CAPACITY MEASURE: CPT

## 2020-10-14 NOTE — PHYSICAL EXAM
[General Appearance - Well Developed] : well developed [Edema] : no peripheral edema [Exaggerated Use Of Accessory Muscles For Inspiration] : no accessory muscle use [] : no hepato-splenomegaly [Abdomen Tenderness] : non-tender [Abdomen Mass (___ Cm)] : no abdominal mass palpated [Abdomen Hernia] : no hernia was discovered [Costovertebral Angle Tenderness] : no ~M costovertebral angle tenderness [Urethral Meatus] : meatus normal [Penis Abnormality] : normal circumcised penis [Epididymis] : the epididymides were normal [Testes Tenderness] : no tenderness of the testes [Testes Mass (___cm)] : there were no testicular masses [Prostate Tenderness] : the prostate was not tender [Prostate Enlargement] : the prostate was not enlarged [Normal Station and Gait] : the gait and station were normal for the patient's age [Skin Color & Pigmentation] : normal skin color and pigmentation [Motor Exam] : the motor exam was normal [Not Anxious] : not anxious [Inguinal Lymph Nodes Enlarged Bilaterally] : inguinal [FreeTextEntry1] : 11 cm;  ? plaque on left side; PVR 22

## 2020-10-14 NOTE — HISTORY OF PRESENT ILLNESS
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 penile curvature\par 2. decreased strength of urinary stream\par \par Patient notes curvature to the left which has been stable x 2 years\par Does no cause erectile dysfunction\par Causes wife discomfort\par wife utilizes lubrication \par \par Urinary symptoms mild nocturia and decreased strength of uriination\par \par  [Urinary Incontinence] : no urinary incontinence [Urinary Frequency] : no urinary frequency [Straining] : no straining [Intermittency] : no intermittency

## 2020-10-14 NOTE — REVIEW OF SYSTEMS
[Poor quality erections] : Poor quality erections [Wake up at night to urinate  How many times?  ___] : wakes up to urinate [unfilled] times during the night [Slow urine stream] : slow urine stream [Negative] : Heme/Lymph

## 2020-10-14 NOTE — ASSESSMENT
[FreeTextEntry1] : Mr. Rosenbaum is a 65-year-old gentleman.  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  This appears to be stable.  It causes him no erectile dysfunction.  However, he notes his wife does complain of pain with sexual intercourse.  She does have difficulty with lubrication however with artificial lubrication she continues to have discomfort.  She states that "it is sitting in a different spot" this causes her discomfort.  They have attempted different positions without the resolution of this complaint.  On examination his penis is 11 cm in length.  There is mild induration on the left side consistent with possible Peyronie's disease.  He has no history of trauma.  He takes no medications associated with Peyronie's disease.  He has no evidence of Dupuytren's contractures in his palms.\par Discussed Peyronies disease; pathology; natural history and treatment options.\par Patient has symptomatic new lesion without curvature.\par Discussed oral medication and lack of proven efficacy.\par Discussed ILV (intralesional verpamil)\par Discussed xiaflex\par Discussed proceeding with DUS \par \par Patient also complains of mild urinary symptoms including nocturia 1-2 times per night and decreased urinary stream.  We discussed the utilization of alpha blockers he is interested in medical management.\par Pt understands that some of the most common side effect  of this medication include dizziness, dry mouth, fatigue, lightheadedness, palpitations. Pt informed to stop the medication should any of these occur.  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

## 2020-10-15 LAB
APPEARANCE: CLEAR
BACTERIA: NEGATIVE
BILIRUBIN URINE: NEGATIVE
BLOOD URINE: NEGATIVE
COLOR: YELLOW
GLUCOSE QUALITATIVE U: NEGATIVE
HYALINE CASTS: 0 /LPF
KETONES URINE: NEGATIVE
LEUKOCYTE ESTERASE URINE: NEGATIVE
MICROSCOPIC-UA: NORMAL
NITRITE URINE: NEGATIVE
PH URINE: 6
PROTEIN URINE: NEGATIVE
RED BLOOD CELLS URINE: 1 /HPF
SPECIFIC GRAVITY URINE: 1.02
SQUAMOUS EPITHELIAL CELLS: 0 /HPF
UROBILINOGEN URINE: NORMAL
WHITE BLOOD CELLS URINE: 0 /HPF

## 2020-10-28 ENCOUNTER — APPOINTMENT (OUTPATIENT)
Dept: UROLOGY | Facility: CLINIC | Age: 65
End: 2020-10-28
Payer: COMMERCIAL

## 2020-10-28 VITALS
SYSTOLIC BLOOD PRESSURE: 113 MMHG | OXYGEN SATURATION: 98 % | TEMPERATURE: 97.8 F | HEART RATE: 74 BPM | DIASTOLIC BLOOD PRESSURE: 70 MMHG

## 2020-10-28 PROCEDURE — 99213 OFFICE O/P EST LOW 20 MIN: CPT | Mod: 25

## 2020-10-28 PROCEDURE — 93980 PENILE VASCULAR STUDY: CPT

## 2020-10-28 PROCEDURE — 76705 ECHO EXAM OF ABDOMEN: CPT

## 2020-10-28 PROCEDURE — 99072 ADDL SUPL MATRL&STAF TM PHE: CPT

## 2020-10-28 PROCEDURE — 54235 NJX CORPORA CAVERNOSA RX AGT: CPT

## 2020-10-28 NOTE — HISTORY OF PRESENT ILLNESS
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 penile curvature\par 2. decreased strength of urinary stream\par \par Patient notes curvature to the left which has been stable x 2 years\par Does no cause erectile dysfunction\par Causes wife discomfort\par wife utilizes lubrication \par \par Urinary symptoms mild nocturia and decreased strength of urination\par \par 10.28.2020\par patient returns for:\par 1. renal ultrasound re stone history\par 2. DUS re penile curvature

## 2020-10-28 NOTE — ASSESSMENT
[FreeTextEntry1] : Mr. Rosenbaum is a 65-year-old gentleman.  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  This appears to be stable.  It causes him no erectile dysfunction.  However, he notes his wife does complain of pain with sexual intercourse.  She does have difficulty with lubrication however with artificial lubrication she continues to have discomfort.  She states that "it is sitting in a different spot" this causes her discomfort.  They have attempted different positions without the resolution of this complaint.  On examination his penis is 11 cm in length.  There is mild induration on the left side consistent with possible Peyronie's disease.  He has no history of trauma.  He takes no medications associated with Peyronie's disease.  He has no evidence of Dupuytren's contractures in his palms.\par Discussed Peyronies disease; pathology; natural history and treatment options.\par Patient has symptomatic new lesion without curvature.\par Discussed oral medication and lack of proven efficacy.\par Discussed ILV (intralesional verpamil)\par Discussed xiaflex\par Discussed proceeding with DUS \par \par Patient also complains of mild urinary symptoms including nocturia 1-2 times per night and decreased urinary stream.  We discussed the utilization of alpha blockers he is interested in medical management.\par Pt understands that some of the most common side effect  of this medication include dizziness, dry mouth, fatigue, lightheadedness, palpitations. Pt informed to stop the medication should any of these occur.  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\par Will proceed with Uroxatral\par \par History renal stones which were passed spontaneously\par Will proceed with renal ultrasound at next visit.\par \par \par \par 10.28.2020\par renal ultrasound demonstrates vascular / mass interpolar in the left kidney with a complex cyst kidney\par these findings were reviewed and demonstrated to the patient\par the importance of a CT urogram was emphasized\par The [facility]YE  expressed fully understanding of the information provided, the consequences and the management.\par \par penile curvature mild curvature was noted to the left\par blood flow appeared to be normal although rigidity was not sufficient to maximize erection\par patient was anxious regarding renal findings and was not reinjected\par He had been advised prior to DUS to defer in light of renal findings but chose to proceed.\par FULL detumescence was documented  prior to leaving office\par \par He will return for followup after CT and proceed with DUS i\par \par

## 2020-10-30 ENCOUNTER — OUTPATIENT (OUTPATIENT)
Dept: OUTPATIENT SERVICES | Facility: HOSPITAL | Age: 65
LOS: 1 days | End: 2020-10-30
Payer: COMMERCIAL

## 2020-10-30 ENCOUNTER — APPOINTMENT (OUTPATIENT)
Dept: CT IMAGING | Facility: IMAGING CENTER | Age: 65
End: 2020-10-30

## 2020-10-30 ENCOUNTER — RESULT REVIEW (OUTPATIENT)
Age: 65
End: 2020-10-30

## 2020-10-30 DIAGNOSIS — Z98.52 VASECTOMY STATUS: Chronic | ICD-10-CM

## 2020-10-30 DIAGNOSIS — R93.429 ABNORMAL RADIOLOGIC FINDINGS ON DIAGNOSTIC IMAGING OF UNSPECIFIED KIDNEY: ICD-10-CM

## 2020-10-30 PROCEDURE — 82565 ASSAY OF CREATININE: CPT

## 2020-10-30 PROCEDURE — 74178 CT ABD&PLV WO CNTR FLWD CNTR: CPT

## 2020-10-30 PROCEDURE — 74178 CT ABD&PLV WO CNTR FLWD CNTR: CPT | Mod: 26

## 2020-11-12 ENCOUNTER — TRANSCRIPTION ENCOUNTER (OUTPATIENT)
Age: 65
End: 2020-11-12

## 2020-11-19 ENCOUNTER — TRANSCRIPTION ENCOUNTER (OUTPATIENT)
Age: 65
End: 2020-11-19

## 2020-12-08 ENCOUNTER — NON-APPOINTMENT (OUTPATIENT)
Age: 65
End: 2020-12-08

## 2020-12-23 PROBLEM — Z12.11 ENCOUNTER FOR COLORECTAL CANCER SCREENING: Status: RESOLVED | Noted: 2020-06-25 | Resolved: 2020-12-23

## 2021-01-27 ENCOUNTER — APPOINTMENT (OUTPATIENT)
Dept: DERMATOLOGY | Facility: CLINIC | Age: 66
End: 2021-01-27
Payer: COMMERCIAL

## 2021-01-27 ENCOUNTER — LABORATORY RESULT (OUTPATIENT)
Age: 66
End: 2021-01-27

## 2021-01-27 VITALS — WEIGHT: 199 LBS | BODY MASS INDEX: 27.75 KG/M2

## 2021-01-27 DIAGNOSIS — Z86.03 PERSONAL HISTORY OF NEOPLASM OF UNCERTAIN BEHAVIOR: ICD-10-CM

## 2021-01-27 DIAGNOSIS — L73.9 FOLLICULAR DISORDER, UNSPECIFIED: ICD-10-CM

## 2021-01-27 DIAGNOSIS — Z12.83 ENCOUNTER FOR SCREENING FOR MALIGNANT NEOPLASM OF SKIN: ICD-10-CM

## 2021-01-27 DIAGNOSIS — D22.9 MELANOCYTIC NEVI, UNSPECIFIED: ICD-10-CM

## 2021-01-27 PROCEDURE — 99214 OFFICE O/P EST MOD 30 MIN: CPT | Mod: 25

## 2021-01-27 PROCEDURE — 11102 TANGNTL BX SKIN SINGLE LES: CPT

## 2021-01-27 PROCEDURE — 99072 ADDL SUPL MATRL&STAF TM PHE: CPT

## 2021-02-03 ENCOUNTER — NON-APPOINTMENT (OUTPATIENT)
Age: 66
End: 2021-02-03

## 2021-02-03 RX ORDER — KETOCONAZOLE 20 MG/G
2 CREAM TOPICAL
Qty: 1 | Refills: 2 | Status: ACTIVE | COMMUNITY
Start: 2021-02-03 | End: 1900-01-01

## 2021-04-14 ENCOUNTER — APPOINTMENT (OUTPATIENT)
Dept: UROLOGY | Facility: CLINIC | Age: 66
End: 2021-04-14
Payer: COMMERCIAL

## 2021-04-14 VITALS — TEMPERATURE: 97.9 F

## 2021-04-14 PROCEDURE — 99072 ADDL SUPL MATRL&STAF TM PHE: CPT

## 2021-04-14 PROCEDURE — 99214 OFFICE O/P EST MOD 30 MIN: CPT

## 2021-04-14 NOTE — HISTORY OF PRESENT ILLNESS
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 penile curvature\par 2. decreased strength of urinary stream\par \par Patient notes curvature to the left which has been stable x 2 years\par Does no cause erectile dysfunction\par Causes wife discomfort\par wife utilizes lubrication \par \par Urinary symptoms mild nocturia and decreased strength of urination\par \par 10.28.2020\par patient returns for:\par 1. renal ultrasound re stone history\par 2. DUS re penile curvature\par \par 4.14.2021\par returns on alfuzosin\par IPSS 7 (markedly improved and pleased)\par BILL 22\par returns re \par 1. LUTS\par 2. PEYRONIES\par 3. COMPLEX CYSt\par 4. ERECTILE DYSUNCTION

## 2021-04-14 NOTE — ASSESSMENT
[FreeTextEntry1] : Mr. Rosenbaum is a 65-year-old gentleman.  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  This appears to be stable.  It causes him no erectile dysfunction.  However, he notes his wife does complain of pain with sexual intercourse.  She does have difficulty with lubrication however with artificial lubrication she continues to have discomfort.  She states that "it is sitting in a different spot" this causes her discomfort.  They have attempted different positions without the resolution of this complaint.  On examination his penis is 11 cm in length.  There is mild induration on the left side consistent with possible Peyronie's disease.  He has no history of trauma.  He takes no medications associated with Peyronie's disease.  He has no evidence of Dupuytren's contractures in his palms.\par Discussed Peyronies disease; pathology; natural history and treatment options.\par Patient has symptomatic new lesion without curvature.\par Discussed oral medication and lack of proven efficacy.\par Discussed ILV (intralesional verpamil)\par Discussed xiaflex\par Discussed proceeding with DUS \par \par Patient also complains of mild urinary symptoms including nocturia 1-2 times per night and decreased urinary stream.  We discussed the utilization of alpha blockers he is interested in medical management.\par Pt understands that some of the most common side effect  of this medication include dizziness, dry mouth, fatigue, lightheadedness, palpitations. Pt informed to stop the medication should any of these occur.  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\par Will proceed with Uroxatral\par \par History renal stones which were passed spontaneously\par Will proceed with renal ultrasound at next visit.\par \par \par \par 10.28.2020\par renal ultrasound demonstrates vascular / mass interpolar in the left kidney with a complex cyst kidney\par these findings were reviewed and demonstrated to the patient\par the importance of a CT urogram was emphasized\par The MONICA FRYE  expressed fully understanding of the information provided, the consequences and the management.\par \par penile curvature mild curvature was noted to the left\par blood flow appeared to be normal although rigidity was not sufficient to maximize erection\par patient was anxious regarding renal findings and was not reinjected\par He had been advised prior to DUS to defer in light of renal findings but chose to proceed.\par FULL detumescence was documented  prior to leaving office\par \par He will return for followup after CT and proceed with DUS i\par \par \par 4.14.2021\par returns\par had CT in 11.2020\par 2.7 cm bosniak 2 cyst\par no solid mass\par scheduled for repeat CT 5/6\par \par continues to complain of penile curvature\par attempted DUS \par did not respond to injection\par patient demonstrated photos\par penile curvature is mild \par options reviewed\par discussed issues of lubrication since curvature is mild\par RESTOREX reviewed \par Will attempt\par \par LUTS improved on medication; continue current regimen\par \par PSA to be checked\par patient wants to proceed

## 2021-04-15 ENCOUNTER — TRANSCRIPTION ENCOUNTER (OUTPATIENT)
Age: 66
End: 2021-04-15

## 2021-04-15 LAB
APPEARANCE: CLEAR
BACTERIA: NEGATIVE
BILIRUBIN URINE: NEGATIVE
BLOOD URINE: NEGATIVE
COLOR: YELLOW
GLUCOSE QUALITATIVE U: NEGATIVE
HYALINE CASTS: 0 /LPF
KETONES URINE: NEGATIVE
LEUKOCYTE ESTERASE URINE: NEGATIVE
MICROSCOPIC-UA: NORMAL
NITRITE URINE: NEGATIVE
PH URINE: 7
PROTEIN URINE: NORMAL
PSA SERPL-MCNC: 0.82 NG/ML
RED BLOOD CELLS URINE: 2 /HPF
SPECIFIC GRAVITY URINE: 1.03
SQUAMOUS EPITHELIAL CELLS: 1 /HPF
UROBILINOGEN URINE: ABNORMAL
WHITE BLOOD CELLS URINE: 1 /HPF

## 2021-05-06 ENCOUNTER — APPOINTMENT (OUTPATIENT)
Dept: UROLOGY | Facility: CLINIC | Age: 66
End: 2021-05-06
Payer: COMMERCIAL

## 2021-05-06 PROCEDURE — 99213 OFFICE O/P EST LOW 20 MIN: CPT

## 2021-05-06 PROCEDURE — 76705 ECHO EXAM OF ABDOMEN: CPT

## 2021-05-06 PROCEDURE — 99072 ADDL SUPL MATRL&STAF TM PHE: CPT

## 2021-05-06 NOTE — ASSESSMENT
[FreeTextEntry1] : Mr. Rosenbaum is a 65-year-old gentleman.  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  This appears to be stable.  It causes him no erectile dysfunction.  However, he notes his wife does complain of pain with sexual intercourse.  She does have difficulty with lubrication however with artificial lubrication she continues to have discomfort.  She states that "it is sitting in a different spot" this causes her discomfort.  They have attempted different positions without the resolution of this complaint.  On examination his penis is 11 cm in length.  There is mild induration on the left side consistent with possible Peyronie's disease.  He has no history of trauma.  He takes no medications associated with Peyronie's disease.  He has no evidence of Dupuytren's contractures in his palms.\par Discussed Peyronies disease; pathology; natural history and treatment options.\par Patient has symptomatic new lesion without curvature.\par Discussed oral medication and lack of proven efficacy.\par Discussed ILV (intralesional verpamil)\par Discussed xiaflex\par Discussed proceeding with DUS \par \par Patient also complains of mild urinary symptoms including nocturia 1-2 times per night and decreased urinary stream.  We discussed the utilization of alpha blockers he is interested in medical management.\par Pt understands that some of the most common side effect  of this medication include dizziness, dry mouth, fatigue, lightheadedness, palpitations. Pt informed to stop the medication should any of these occur.  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\par Will proceed with Uroxatral\par \par History renal stones which were passed spontaneously\par Will proceed with renal ultrasound at next visit.\par \par \par \par 10.28.2020\par renal ultrasound demonstrates vascular / mass interpolar in the left kidney with a complex cyst kidney\par these findings were reviewed and demonstrated to the patient\par the importance of a CT urogram was emphasized\par The MONICA FRYE  expressed fully understanding of the information provided, the consequences and the management.\par \par penile curvature mild curvature was noted to the left\par blood flow appeared to be normal although rigidity was not sufficient to maximize erection\par patient was anxious regarding renal findings and was not reinjected\par He had been advised prior to DUS to defer in light of renal findings but chose to proceed.\par FULL detumescence was documented  prior to leaving office\par \par He will return for followup after CT and proceed with DUS i\par \par \par 4.14.2021\par returns\par had CT in 11.2020\par 2.7 cm bosniak 2 cyst\par no solid mass\par scheduled for repeat CT 5/6\par \par continues to complain of penile curvature\par attempted DUS \par did not respond to injection\par patient demonstrated photos\par penile curvature is mild \par options reviewed\par discussed issues of lubrication since curvature is mild\par RESTOREX reviewed \par Will attempt\par \par LUTS improved on medication; continue current regimen\par \par PSA to be checked\par patient wants to proceed\par \par 5.6.2021\par PSA reviewed\par \par Renal USG today demonstrates column of Mohinder and complex cyst\par Size of cyst not changed \par Reviewed prior CT and inner septations which enhance\par Discussed significance \par Discussed proceeding with repeat CT in 3 months \par Patient wants to proceed "i don’t want to take a chance"\par \par Peyronies disease discussed.  As discussed at  last visit, issues related to partner lubrication and dyspareunia rather than degree of curvature.  Partner has utilized lubricant and couple has been able to engage in satisfactory \par sexual intercourse .  Pleased

## 2021-05-06 NOTE — HISTORY OF PRESENT ILLNESS
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 penile curvature\par 2. decreased strength of urinary stream\par \par Patient notes curvature to the left which has been stable x 2 years\par Does no cause erectile dysfunction\par Causes wife discomfort\par wife utilizes lubrication \par \par Urinary symptoms mild nocturia and decreased strength of urination\par \par 10.28.2020\par patient returns for:\par 1. renal ultrasound re stone history\par 2. DUS re penile curvature\par \par 4.14.2021\par returns on alfuzosin\par IPSS 7 (markedly improved and pleased)\par BILL 22\par returns re \par 1. LUTS\par 2. PEYRONIES\par 3. COMPLEX CYSt\par 4. ERECTILE DYSUNCTION\par \par 5.6.2021\par returns for renal ultrasound and management

## 2021-05-25 ENCOUNTER — APPOINTMENT (OUTPATIENT)
Dept: CT IMAGING | Facility: IMAGING CENTER | Age: 66
End: 2021-05-25
Payer: COMMERCIAL

## 2021-05-25 ENCOUNTER — OUTPATIENT (OUTPATIENT)
Dept: OUTPATIENT SERVICES | Facility: HOSPITAL | Age: 66
LOS: 1 days | End: 2021-05-25
Payer: COMMERCIAL

## 2021-05-25 DIAGNOSIS — N28.1 CYST OF KIDNEY, ACQUIRED: ICD-10-CM

## 2021-05-25 DIAGNOSIS — Z98.52 VASECTOMY STATUS: Chronic | ICD-10-CM

## 2021-05-25 DIAGNOSIS — Z00.8 ENCOUNTER FOR OTHER GENERAL EXAMINATION: ICD-10-CM

## 2021-05-25 PROCEDURE — 74170 CT ABD WO CNTRST FLWD CNTRST: CPT | Mod: 26

## 2021-05-25 PROCEDURE — 74170 CT ABD WO CNTRST FLWD CNTRST: CPT

## 2021-05-25 PROCEDURE — 82565 ASSAY OF CREATININE: CPT

## 2021-05-28 ENCOUNTER — TRANSCRIPTION ENCOUNTER (OUTPATIENT)
Age: 66
End: 2021-05-28

## 2021-06-08 ENCOUNTER — APPOINTMENT (OUTPATIENT)
Dept: INTERNAL MEDICINE | Facility: CLINIC | Age: 66
End: 2021-06-08
Payer: COMMERCIAL

## 2021-06-08 ENCOUNTER — NON-APPOINTMENT (OUTPATIENT)
Age: 66
End: 2021-06-08

## 2021-06-08 VITALS
BODY MASS INDEX: 29.54 KG/M2 | HEIGHT: 71 IN | WEIGHT: 211 LBS | OXYGEN SATURATION: 98 % | TEMPERATURE: 97.1 F | HEART RATE: 68 BPM | SYSTOLIC BLOOD PRESSURE: 122 MMHG | DIASTOLIC BLOOD PRESSURE: 80 MMHG

## 2021-06-08 DIAGNOSIS — Z87.442 PERSONAL HISTORY OF URINARY CALCULI: ICD-10-CM

## 2021-06-08 DIAGNOSIS — K63.5 POLYP OF COLON: ICD-10-CM

## 2021-06-08 DIAGNOSIS — E66.3 OVERWEIGHT: ICD-10-CM

## 2021-06-08 PROCEDURE — G0444 DEPRESSION SCREEN ANNUAL: CPT | Mod: 59

## 2021-06-08 PROCEDURE — 99396 PREV VISIT EST AGE 40-64: CPT | Mod: 25

## 2021-06-08 PROCEDURE — 99072 ADDL SUPL MATRL&STAF TM PHE: CPT

## 2021-06-08 PROCEDURE — 93000 ELECTROCARDIOGRAM COMPLETE: CPT | Mod: 59

## 2021-06-08 PROCEDURE — 36415 COLL VENOUS BLD VENIPUNCTURE: CPT

## 2021-06-08 RX ORDER — SODIUM SULFATE, POTASSIUM SULFATE, MAGNESIUM SULFATE 17.5; 3.13; 1.6 G/ML; G/ML; G/ML
17.5-3.13-1.6 SOLUTION, CONCENTRATE ORAL
Qty: 1 | Refills: 0 | Status: DISCONTINUED | COMMUNITY
Start: 2020-06-25 | End: 2021-06-08

## 2021-06-08 RX ORDER — CLINDAMYCIN PHOSPHATE 10 MG/ML
1 LOTION TOPICAL DAILY
Qty: 1 | Refills: 6 | Status: DISCONTINUED | COMMUNITY
Start: 2021-01-27 | End: 2021-06-08

## 2021-06-08 NOTE — HISTORY OF PRESENT ILLNESS
[FreeTextEntry1] : \par 66 y/o man presents for annual physical exam. he feels well currently w no new concerns.\par no illnesses during COVID19 pandemic. he had COVID vaccination x 2 doses. he has gained some weight. \par \par following w Dr Mckeon urology for tx of LUTS, now improved on alfuzosin, hx of renal stones. abnormal L renal cyst stable on imaging and continuing to monitor \par \par hyperlipidemia well controlled on low dose statin \par \par follows w dermatology yearly for multiple skin lesions, no skin cancer detected  \par \par repeat screening colonoscopy completed in 9/20 w Dr Wade, multiple polyps, TAs. repeat in 5 years advised

## 2021-06-08 NOTE — PHYSICAL EXAM
[No Acute Distress] : no acute distress [Well Nourished] : well nourished [Well Developed] : well developed [Well-Appearing] : well-appearing [Normal Voice/Communication] : normal voice/communication [Normal Sclera/Conjunctiva] : normal sclera/conjunctiva [PERRL] : pupils equal round and reactive to light [Normal Outer Ear/Nose] : the outer ears and nose were normal in appearance [Normal Oropharynx] : the oropharynx was normal [Normal TMs] : both tympanic membranes were normal [No JVD] : no jugular venous distention [Supple] : supple [No Lymphadenopathy] : no lymphadenopathy [Thyroid Normal, No Nodules] : the thyroid was normal and there were no nodules present [No Respiratory Distress] : no respiratory distress  [Clear to Auscultation] : lungs were clear to auscultation bilaterally [No Accessory Muscle Use] : no accessory muscle use [Normal Rate] : normal rate  [Regular Rhythm] : with a regular rhythm [Normal S1, S2] : normal S1 and S2 [No Murmur] : no murmur heard [No Carotid Bruits] : no carotid bruits [No Abdominal Bruit] : a ~M bruit was not heard ~T in the abdomen [No Varicosities] : no varicosities [Pedal Pulses Present] : the pedal pulses are present [No Edema] : there was no peripheral edema [No Extremity Clubbing/Cyanosis] : no extremity clubbing/cyanosis [No Palpable Aorta] : no palpable aorta [Soft] : abdomen soft [Non Tender] : non-tender [Non-distended] : non-distended [No Masses] : no abdominal mass palpated [No HSM] : no HSM [Normal Bowel Sounds] : normal bowel sounds [Declined Rectal Exam] : declined rectal exam [Normal Supraclavicular Nodes] : no supraclavicular lymphadenopathy [Normal Posterior Cervical Nodes] : no posterior cervical lymphadenopathy [Normal Anterior Cervical Nodes] : no anterior cervical lymphadenopathy [No CVA Tenderness] : no CVA  tenderness [No Spinal Tenderness] : no spinal tenderness [No Joint Swelling] : no joint swelling [Grossly Normal Strength/Tone] : grossly normal strength/tone [No Rash] : no rash [Normal Gait] : normal gait [Coordination Grossly Intact] : coordination grossly intact [No Focal Deficits] : no focal deficits [Deep Tendon Reflexes (DTR)] : deep tendon reflexes were 2+ and symmetric [Speech Grossly Normal] : speech grossly normal [Normal Affect] : the affect was normal [Alert and Oriented x3] : oriented to person, place, and time [Normal Mood] : the mood was normal [Normal Insight/Judgement] : insight and judgment were intact [de-identified] : minimal b/l cerumen [FreeTextEntry1] : done w urology  [de-identified] :  multiple skin lesions on back, chest unchanged

## 2021-06-08 NOTE — REVIEW OF SYSTEMS
[Negative] : Heme/Lymph [Dysuria] : no dysuria [Incontinence] : no incontinence [Hesitancy] : no hesitancy [Hematuria] : no hematuria [Frequency] : no frequency [Mole Changes] : no mole changes [de-identified] : b/l inner ear dermatitis unchanged

## 2021-06-08 NOTE — HEALTH RISK ASSESSMENT
[No] : In the past 12 months have you used drugs other than those required for medical reasons? No [No falls in past year] : Patient reported no falls in the past year [0] : 2) Feeling down, depressed, or hopeless: Not at all (0) [None] : None [] :  [] : No [ColonoscopyDate] : 09/20 [ColonoscopyComments] : - polyps

## 2021-06-08 NOTE — ASSESSMENT
[FreeTextEntry1] : discussed w pt \par \par reviewed current rx \par reviewed recent f/u w urologist, monitoring L complex renal cyst stable, cont imaging and f/u as scheduled . monitoring Peyronies as well \par \par check routine labs as below\par \par cont statin , monitor lipids, improved \par \par cont diet control, exercise, weight control \par \par dermatology f/u yearly \par \par screening colonoscopy updated in 2020, benign polyps, repeat 5 years \par \par vaccinations reviewed and UTD including COVID vaccines, Shingrix . will start pneumococcal vaccines next year \par \par RTO 6 months for routine f/u or earlier prn if any new concerns  \par

## 2021-07-18 ENCOUNTER — RX RENEWAL (OUTPATIENT)
Age: 66
End: 2021-07-18

## 2021-07-19 LAB
ALBUMIN SERPL ELPH-MCNC: 5.1 G/DL
ALP BLD-CCNC: 67 U/L
ALT SERPL-CCNC: 28 U/L
ANION GAP SERPL CALC-SCNC: 13 MMOL/L
AST SERPL-CCNC: 26 U/L
BASOPHILS # BLD AUTO: 0.04 K/UL
BASOPHILS NFR BLD AUTO: 0.5 %
BILIRUB SERPL-MCNC: 1.2 MG/DL
BUN SERPL-MCNC: 15 MG/DL
CALCIUM SERPL-MCNC: 9.8 MG/DL
CHLORIDE SERPL-SCNC: 105 MMOL/L
CHOLEST SERPL-MCNC: 173 MG/DL
CO2 SERPL-SCNC: 26 MMOL/L
CREAT SERPL-MCNC: 1.14 MG/DL
EOSINOPHIL # BLD AUTO: 0.11 K/UL
EOSINOPHIL NFR BLD AUTO: 1.4 %
ESTIMATED AVERAGE GLUCOSE: 111 MG/DL
GLUCOSE SERPL-MCNC: 89 MG/DL
HBA1C MFR BLD HPLC: 5.5 %
HCT VFR BLD CALC: 40 %
HDLC SERPL-MCNC: 37 MG/DL
HGB BLD-MCNC: 13.5 G/DL
IMM GRANULOCYTES NFR BLD AUTO: 0.2 %
LDLC SERPL CALC-MCNC: 95 MG/DL
LYMPHOCYTES # BLD AUTO: 2.36 K/UL
LYMPHOCYTES NFR BLD AUTO: 29.3 %
MAN DIFF?: NORMAL
MCHC RBC-ENTMCNC: 30.7 PG
MCHC RBC-ENTMCNC: 33.8 GM/DL
MCV RBC AUTO: 90.9 FL
MONOCYTES # BLD AUTO: 0.66 K/UL
MONOCYTES NFR BLD AUTO: 8.2 %
NEUTROPHILS # BLD AUTO: 4.86 K/UL
NEUTROPHILS NFR BLD AUTO: 60.4 %
NONHDLC SERPL-MCNC: 136 MG/DL
PLATELET # BLD AUTO: 196 K/UL
POTASSIUM SERPL-SCNC: 4.3 MMOL/L
PROT SERPL-MCNC: 7.8 G/DL
RBC # BLD: 4.4 M/UL
RBC # FLD: 12.4 %
SODIUM SERPL-SCNC: 144 MMOL/L
T4 FREE SERPL-MCNC: 0.9 NG/DL
TRIGL SERPL-MCNC: 205 MG/DL
TSH SERPL-ACNC: 2.26 UIU/ML
WBC # FLD AUTO: 8.05 K/UL

## 2021-10-08 ENCOUNTER — APPOINTMENT (OUTPATIENT)
Dept: UROLOGY | Facility: CLINIC | Age: 66
End: 2021-10-08
Payer: COMMERCIAL

## 2021-10-08 VITALS — TEMPERATURE: 98.3 F | SYSTOLIC BLOOD PRESSURE: 121 MMHG | DIASTOLIC BLOOD PRESSURE: 78 MMHG

## 2021-10-08 DIAGNOSIS — N48.6 INDURATION PENIS PLASTICA: ICD-10-CM

## 2021-10-08 PROCEDURE — 99214 OFFICE O/P EST MOD 30 MIN: CPT | Mod: 25

## 2021-10-08 PROCEDURE — 51741 ELECTRO-UROFLOWMETRY FIRST: CPT

## 2021-10-08 PROCEDURE — 51798 US URINE CAPACITY MEASURE: CPT

## 2021-10-08 NOTE — PHYSICAL EXAM
[Urethral Meatus] : meatus normal [Penis Abnormality] : normal circumcised penis [Epididymis] : the epididymides were normal [Testes Tenderness] : no tenderness of the testes [Testes Mass (___cm)] : there were no testicular masses [Prostate Enlargement] : the prostate was not enlarged [Prostate Tenderness] : the prostate was not tender [FreeTextEntry1] : PVr 66; flow 21.8cc/sec; voided volume 237

## 2021-10-08 NOTE — ASSESSMENT
[FreeTextEntry1] : Mr. Rosenbaum is a 65-year-old gentleman.  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  This appears to be stable.  It causes him no erectile dysfunction.  However, he notes his wife does complain of pain with sexual intercourse.  She does have difficulty with lubrication however with artificial lubrication she continues to have discomfort.  She states that "it is sitting in a different spot" this causes her discomfort.  They have attempted different positions without the resolution of this complaint.  On examination his penis is 11 cm in length.  There is mild induration on the left side consistent with possible Peyronie's disease.  He has no history of trauma.  He takes no medications associated with Peyronie's disease.  He has no evidence of Dupuytren's contractures in his palms.\par Discussed Peyronies disease; pathology; natural history and treatment options.\par Patient has symptomatic new lesion without curvature.\par Discussed oral medication and lack of proven efficacy.\par Discussed ILV (intralesional verpamil)\par Discussed xiaflex\par Discussed proceeding with DUS \par \par Patient also complains of mild urinary symptoms including nocturia 1-2 times per night and decreased urinary stream.  We discussed the utilization of alpha blockers he is interested in medical management.\par Pt understands that some of the most common side effect  of this medication include dizziness, dry mouth, fatigue, lightheadedness, palpitations. Pt informed to stop the medication should any of these occur.  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\par Will proceed with Uroxatral\par \par History renal stones which were passed spontaneously\par Will proceed with renal ultrasound at next visit.\par \par \par \par 10.28.2020\par renal ultrasound demonstrates vascular / mass interpolar in the left kidney with a complex cyst kidney\par these findings were reviewed and demonstrated to the patient\par the importance of a CT urogram was emphasized\par The MONICA JOHNS  expressed fully understanding of the information provided, the consequences and the management.\par \par penile curvature mild curvature was noted to the left\par blood flow appeared to be normal although rigidity was not sufficient to maximize erection\par patient was anxious regarding renal findings and was not reinjected\par He had been advised prior to DUS to defer in light of renal findings but chose to proceed.\par FULL detumescence was documented  prior to leaving office\par \par He will return for followup after CT and proceed with DUS i\par \par \par 4.14.2021\par returns\par had CT in 11.2020\par 2.7 cm bosniak 2 cyst\par no solid mass\par scheduled for repeat CT 5/6\par \par continues to complain of penile curvature\par attempted DUS \par did not respond to injection\par patient demonstrated photos\par penile curvature is mild \par options reviewed\par discussed issues of lubrication since curvature is mild\par RESTOREX reviewed \par Will attempt\par \par LUTS improved on medication; continue current regimen\par \par PSA to be checked\par patient wants to proceed\par \par 5.6.2021\par PSA reviewed\par \par Renal USG today demonstrates column of Mohinder and complex cyst\par Size of cyst not changed \par Reviewed prior CT and inner septations which enhance\par Discussed significance \par Discussed proceeding with repeat CT in 3 months \par Patient wants to proceed "i don’t want to take a chance"\par \par Peyronies disease discussed.  As discussed at  last visit, issues related to partner lubrication and dyspareunia rather than degree of curvature.  Partner has utilized lubricant and couple has been able to engage in satisfactory \par sexual intercourse .  Pleased \par \par \par 10.8.2021\par \par Mr. Johns returns he has several urologic problems.:\par 1.  Peyronie's disease.  He is in his penile curvature which is stable.  He and his wife accommodate to this.  He is not having any pain.  He is pleased.  Sexual function score is excellent.\par \par 2.  Lower urinary tract symptoms.  He is taking Uroxatral.  He does not his IPSS score is 4.  His flow rate is excellent and his PVR is satisfactory.  He is pleased with his urinary symptoms.\par \par 3.  Complex renal cyst.  He had a CT scan and will have a follow-up ultrasound at this point.  We discussed alternating CT scans and ultrasound pending the results of the studies.\par \par 4.  His PSA is excellent and his prostate exam is normal.\par \par Plan:\par 1.  Urinalysis\par 2.  Renal ultrasound\par Follow-up in 6 months\par

## 2021-10-08 NOTE — HISTORY OF PRESENT ILLNESS
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 penile curvature\par 2. decreased strength of urinary stream\par \par Patient notes curvature to the left which has been stable x 2 years\par Does no cause erectile dysfunction\par Causes wife discomfort\par wife utilizes lubrication \par \par Urinary symptoms mild nocturia and decreased strength of urination\par \par 10.28.2020\par patient returns for:\par 1. renal ultrasound re stone history\par 2. DUS re penile curvature\par \par 4.14.2021\par returns on alfuzosin\par IPSS 7 (markedly improved and pleased)\par BILL 22\par returns re \par 1. LUTS\par 2. PEYRONIES\par 3. COMPLEX CYSt\par 4. ERECTILE DYSUNCTION\par \par 5.6.2021\par returns for renal ultrasound and management\par \par 10.8.2021\par IPSS 4\par BILL 20 \par penile curvature stable\par renal complex cyst

## 2021-10-14 ENCOUNTER — RX RENEWAL (OUTPATIENT)
Age: 66
End: 2021-10-14

## 2021-10-25 ENCOUNTER — APPOINTMENT (OUTPATIENT)
Dept: ULTRASOUND IMAGING | Facility: CLINIC | Age: 66
End: 2021-10-25

## 2021-11-03 ENCOUNTER — APPOINTMENT (OUTPATIENT)
Dept: ULTRASOUND IMAGING | Facility: CLINIC | Age: 66
End: 2021-11-03
Payer: MEDICARE

## 2021-11-03 ENCOUNTER — OUTPATIENT (OUTPATIENT)
Dept: OUTPATIENT SERVICES | Facility: HOSPITAL | Age: 66
LOS: 1 days | End: 2021-11-03

## 2021-11-03 ENCOUNTER — RESULT REVIEW (OUTPATIENT)
Age: 66
End: 2021-11-03

## 2021-11-03 DIAGNOSIS — Z98.52 VASECTOMY STATUS: Chronic | ICD-10-CM

## 2021-11-03 PROCEDURE — 76770 US EXAM ABDO BACK WALL COMP: CPT | Mod: 26

## 2022-03-24 ENCOUNTER — APPOINTMENT (OUTPATIENT)
Dept: DERMATOLOGY | Facility: CLINIC | Age: 67
End: 2022-03-24
Payer: MEDICARE

## 2022-03-24 ENCOUNTER — LABORATORY RESULT (OUTPATIENT)
Age: 67
End: 2022-03-24

## 2022-03-24 DIAGNOSIS — Z86.03 PERSONAL HISTORY OF NEOPLASM OF UNCERTAIN BEHAVIOR: ICD-10-CM

## 2022-03-24 DIAGNOSIS — L85.1 ACQUIRED KERATOSIS [KERATODERMA] PALMARIS ET PLANTARIS: ICD-10-CM

## 2022-03-24 DIAGNOSIS — D48.5 NEOPLASM OF UNCERTAIN BEHAVIOR OF SKIN: ICD-10-CM

## 2022-03-24 DIAGNOSIS — D22.9 MELANOCYTIC NEVI, UNSPECIFIED: ICD-10-CM

## 2022-03-24 PROCEDURE — 11103 TANGNTL BX SKIN EA SEP/ADDL: CPT

## 2022-03-24 PROCEDURE — 99213 OFFICE O/P EST LOW 20 MIN: CPT | Mod: 25

## 2022-03-24 PROCEDURE — 11102 TANGNTL BX SKIN SINGLE LES: CPT

## 2022-03-24 NOTE — HISTORY OF PRESENT ILLNESS
[FreeTextEntry1] : growth on left cheek [de-identified] : 66 year old male with growth on left cheek. enlarging.

## 2022-03-24 NOTE — ASSESSMENT
[FreeTextEntry1] : 1) benign findings as above- education\par \par 2) Shave bx location x3; left cheek medial, lateral, superior\par diagnosis: r/o ISK\par  \par Shave biopsy performed today over above location, risks and benefits discussed including incomplete removal, not enough tissue for diagnosis scarring and infection, informed consent obtained, pictures taken,  cleaned with alcohol and anesthetized with 1%lido+epi, 0.3 cc total, hemostasis obtained with AlCl and cautery to periphery, vaseline and bandaid placed, tolerated well, wound care reviewed, specimen sent to pathology.\par \par

## 2022-03-24 NOTE — PHYSICAL EXAM
[FreeTextEntry3] : AAOx3, pleasant, NAD, no visual lymphadenopathy\par hair, scalp, face, nose, eyelids, ears, lips, oropharynx, neck, chest, abdomen, back, right arm, left arm, nails, and hands examined with all normal findings,\par pertinent findings include:\par \par multiple benign nevi and lentigines\par Scaling waxy stuck on papule;\par verrucous plaque on left cheek

## 2022-04-04 ENCOUNTER — NON-APPOINTMENT (OUTPATIENT)
Age: 67
End: 2022-04-04

## 2022-04-06 ENCOUNTER — NON-APPOINTMENT (OUTPATIENT)
Age: 67
End: 2022-04-06

## 2022-04-11 ENCOUNTER — TRANSCRIPTION ENCOUNTER (OUTPATIENT)
Age: 67
End: 2022-04-11

## 2022-04-11 ENCOUNTER — RX RENEWAL (OUTPATIENT)
Age: 67
End: 2022-04-11

## 2022-04-11 PROBLEM — Z11.59 SCREENING FOR VIRAL DISEASE: Status: ACTIVE | Noted: 2020-05-28

## 2022-05-19 ENCOUNTER — APPOINTMENT (OUTPATIENT)
Dept: UROLOGY | Facility: CLINIC | Age: 67
End: 2022-05-19
Payer: MEDICARE

## 2022-05-19 DIAGNOSIS — Z87.438 PERSONAL HISTORY OF OTHER DISEASES OF MALE GENITAL ORGANS: ICD-10-CM

## 2022-05-19 PROCEDURE — 99214 OFFICE O/P EST MOD 30 MIN: CPT

## 2022-05-19 PROCEDURE — 76705 ECHO EXAM OF ABDOMEN: CPT

## 2022-05-19 NOTE — HISTORY OF PRESENT ILLNESS
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 penile curvature\par 2. decreased strength of urinary stream\par \par Patient notes curvature to the left which has been stable x 2 years\par Does no cause erectile dysfunction\par Causes wife discomfort\par wife utilizes lubrication \par \par Urinary symptoms mild nocturia and decreased strength of urination\par \par 10.28.2020\par patient returns for:\par 1. renal ultrasound re stone history\par 2. DUS re penile curvature\par \par 4.14.2021\par returns on alfuzosin\par IPSS 7 (markedly improved and pleased)\par BILL 22\par returns re \par 1. LUTS\par 2. PEYRONIES\par 3. COMPLEX CYSt\par 4. ERECTILE DYSUNCTION\par \par 5.6.2021\par returns for renal ultrasound and management\par \par 10.8.2021\par IPSS 4\par BILL 20 \par penile curvature stable\par renal complex cyst\par \par 5.14.2022\par returns for ultrasound and management of erectile dysfunction\par curvature improved\par not problematic\par variable erections ; at times good function at times decreased rigidify\par wife "feels it could be better" [Urinary Retention] : no urinary retention [Urinary Urgency] : no urinary urgency [Urinary Frequency] : no urinary frequency [Straining] : no straining [Weak Stream] : no weak stream

## 2022-05-19 NOTE — LETTER BODY
[FreeTextEntry2] : Boo Hung MD [FreeTextEntry1] :  \par Dear Doctor,\par \par \par I had the opportunity to see your patient, Mr. MONICA FRYE in followup. I am enclosing my office note for your information.\par \par I will keep you informed of any developments.\par \par Feel free to contact me if you have any questions.\par \par Sincerely,\par \par Alessandro Mckeon MD, FACS\par Professor of Urology\par [facility]\par \par [address]\par [place], [place] [address]\par \par [address]\par [place], [place] [address]\par \par Office Telephone \par [phone]\par \par Fax\par [phone]\par

## 2022-05-19 NOTE — PHYSICAL EXAM
[Abdomen Tenderness] : non-tender [] : no hepato-splenomegaly [Abdomen Mass (___ Cm)] : no abdominal mass palpated [Abdomen Hernia] : no hernia was discovered [Costovertebral Angle Tenderness] : no ~M costovertebral angle tenderness [Urethral Meatus] : meatus normal [Penis Abnormality] : normal circumcised penis [Epididymis] : the epididymides were normal [Testes Tenderness] : no tenderness of the testes [Testes Mass (___cm)] : there were no testicular masses [Prostate Enlargement] : the prostate was not enlarged [Prostate Tenderness] : the prostate was not tender

## 2022-05-19 NOTE — ASSESSMENT
[FreeTextEntry1] : Mr. Rosenbaum is a 65-year-old gentleman.  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  This appears to be stable.  It causes him no erectile dysfunction.  However, he notes his wife does complain of pain with sexual intercourse.  She does have difficulty with lubrication however with artificial lubrication she continues to have discomfort.  She states that "it is sitting in a different spot" this causes her discomfort.  They have attempted different positions without the resolution of this complaint.  On examination his penis is 11 cm in length.  There is mild induration on the left side consistent with possible Peyronie's disease.  He has no history of trauma.  He takes no medications associated with Peyronie's disease.  He has no evidence of Dupuytren's contractures in his palms.\par Discussed Peyronies disease; pathology; natural history and treatment options.\par Patient has symptomatic new lesion without curvature.\par Discussed oral medication and lack of proven efficacy.\par Discussed ILV (intralesional verpamil)\par Discussed xiaflex\par Discussed proceeding with DUS \par \par Patient also complains of mild urinary symptoms including nocturia 1-2 times per night and decreased urinary stream.  We discussed the utilization of alpha blockers he is interested in medical management.\par Pt understands that some of the most common side effect  of this medication include dizziness, dry mouth, fatigue, lightheadedness, palpitations. Pt informed to stop the medication should any of these occur.  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\par Will proceed with Uroxatral\par \par History renal stones which were passed spontaneously\par Will proceed with renal ultrasound at next visit.\par \par \par \par 10.28.2020\par renal ultrasound demonstrates vascular / mass interpolar in the left kidney with a complex cyst kidney\par these findings were reviewed and demonstrated to the patient\par the importance of a CT urogram was emphasized\par The MONICA FRYE  expressed fully understanding of the information provided, the consequences and the management.\par \par penile curvature mild curvature was noted to the left\par blood flow appeared to be normal although rigidity was not sufficient to maximize erection\par patient was anxious regarding renal findings and was not reinjected\par He had been advised prior to DUS to defer in light of renal findings but chose to proceed.\par FULL detumescence was documented  prior to leaving office\par \par He will return for followup after CT and proceed with DUS i\par \par \par 4.14.2021\par returns\par had CT in 11.2020\par 2.7 cm bosniak 2 cyst\par no solid mass\par scheduled for repeat CT 5/6\par \par continues to complain of penile curvature\par attempted DUS \par did not respond to injection\par patient demonstrated photos\par penile curvature is mild \par options reviewed\par discussed issues of lubrication since curvature is mild\par RESTOREX reviewed \par Will attempt\par \par LUTS improved on medication; continue current regimen\par \par PSA to be checked\par patient wants to proceed\par \par 5.6.2021\par PSA reviewed\par \par Renal USG today demonstrates column of Mohinder and complex cyst\par Size of cyst not changed \par Reviewed prior CT and inner septations which enhance\par Discussed significance \par Discussed proceeding with repeat CT in 3 months \par Patient wants to proceed "i don’t want to take a chance"\par \par Peyronies disease discussed.  As discussed at  last visit, issues related to partner lubrication and dyspareunia rather than degree of curvature.  Partner has utilized lubricant and couple has been able to engage in satisfactory \par sexual intercourse .  Pleased \par \par 5.19.2022\par renal cyst essentially unchanged on USG today\par images demonstrated to patient\par CT in 6 months\par \par LUTS doing wll on alfuzosin\par continue curent regimen\par \par erectile dysfunction\par Discussed PD5-I possible side effects, onset of action, duration of action, and food interactions.  Discussed behavioral strategies to optimize efficacy of medication.  \par DIscussed the potential advantages and disadvantages as well as side effect profiles of each.\par Warned re priapism and need for intervention to prevent permanent penile injury,\par discussed dosing relative to alfuzosin\par

## 2022-05-20 ENCOUNTER — TRANSCRIPTION ENCOUNTER (OUTPATIENT)
Age: 67
End: 2022-05-20

## 2022-05-20 LAB
APPEARANCE: CLEAR
BACTERIA: NEGATIVE
BILIRUBIN URINE: NEGATIVE
BLOOD URINE: NEGATIVE
COLOR: NORMAL
GLUCOSE QUALITATIVE U: NEGATIVE
HYALINE CASTS: 0 /LPF
KETONES URINE: NEGATIVE
LEUKOCYTE ESTERASE URINE: NEGATIVE
MICROSCOPIC-UA: NORMAL
NITRITE URINE: NEGATIVE
PH URINE: 5.5
PROTEIN URINE: NEGATIVE
PSA SERPL-MCNC: 0.69 NG/ML
RED BLOOD CELLS URINE: 1 /HPF
SPECIFIC GRAVITY URINE: 1.02
SQUAMOUS EPITHELIAL CELLS: 0 /HPF
UROBILINOGEN URINE: NORMAL
WHITE BLOOD CELLS URINE: 0 /HPF

## 2022-06-09 ENCOUNTER — TRANSCRIPTION ENCOUNTER (OUTPATIENT)
Age: 67
End: 2022-06-09

## 2022-08-01 ENCOUNTER — NON-APPOINTMENT (OUTPATIENT)
Age: 67
End: 2022-08-01

## 2022-08-01 ENCOUNTER — APPOINTMENT (OUTPATIENT)
Dept: INTERNAL MEDICINE | Facility: CLINIC | Age: 67
End: 2022-08-01

## 2022-08-01 VITALS
HEIGHT: 71 IN | OXYGEN SATURATION: 97 % | HEART RATE: 67 BPM | SYSTOLIC BLOOD PRESSURE: 120 MMHG | BODY MASS INDEX: 30.1 KG/M2 | DIASTOLIC BLOOD PRESSURE: 80 MMHG | TEMPERATURE: 97.9 F | WEIGHT: 215 LBS

## 2022-08-01 DIAGNOSIS — Z23 ENCOUNTER FOR IMMUNIZATION: ICD-10-CM

## 2022-08-01 DIAGNOSIS — R94.31 ABNORMAL ELECTROCARDIOGRAM [ECG] [EKG]: ICD-10-CM

## 2022-08-01 PROCEDURE — 90677 PCV20 VACCINE IM: CPT

## 2022-08-01 PROCEDURE — G0402 INITIAL PREVENTIVE EXAM: CPT

## 2022-08-01 PROCEDURE — G0403: CPT

## 2022-08-01 PROCEDURE — 36415 COLL VENOUS BLD VENIPUNCTURE: CPT

## 2022-08-01 PROCEDURE — G0009: CPT

## 2022-08-02 PROBLEM — R94.31 T WAVE INVERSION IN EKG: Status: ACTIVE | Noted: 2022-08-02

## 2022-08-03 ENCOUNTER — NON-APPOINTMENT (OUTPATIENT)
Age: 67
End: 2022-08-03

## 2022-08-24 ENCOUNTER — RX CHANGE (OUTPATIENT)
Age: 67
End: 2022-08-24

## 2022-08-29 ENCOUNTER — NON-APPOINTMENT (OUTPATIENT)
Age: 67
End: 2022-08-29

## 2022-08-29 ENCOUNTER — APPOINTMENT (OUTPATIENT)
Dept: CARDIOLOGY | Facility: CLINIC | Age: 67
End: 2022-08-29

## 2022-08-29 VITALS
BODY MASS INDEX: 30.8 KG/M2 | WEIGHT: 220 LBS | HEART RATE: 66 BPM | HEIGHT: 71 IN | DIASTOLIC BLOOD PRESSURE: 76 MMHG | OXYGEN SATURATION: 98 % | SYSTOLIC BLOOD PRESSURE: 138 MMHG

## 2022-08-29 PROCEDURE — 93000 ELECTROCARDIOGRAM COMPLETE: CPT

## 2022-08-29 PROCEDURE — 99204 OFFICE O/P NEW MOD 45 MIN: CPT

## 2022-08-29 NOTE — CARDIOLOGY SUMMARY
[de-identified] : 8/29/2022, sinus 64 bpm, LVH with strain pattern - the strain pattern was not previously noted on 2021 ECG

## 2022-08-29 NOTE — HISTORY OF PRESENT ILLNESS
[FreeTextEntry1] : Patient is a 67 year-old white gentleman with known past medical history of dyslipidemia, recently presented to see his PMD, Boo Hung MD for his annual physical exam, and he was noted to have new T-wave inversions in the lateral leads. The voltage continues to be consistent with LVH.\par Plays basketball three times per week, but recently hurt his knee. Sees José Luis Nguyễn MD for orthopedics. \par He never has symptoms of chest pain or shortness of breath.\par He is concerned because of a family history of coronary artery disease in his father and paternal grandfather. \par \par Patient has not been sick with Covid-19.\par He has received two doses of Covid-19 vaccine and two boosters (all Moderna).

## 2022-08-29 NOTE — DISCUSSION/SUMMARY
[EKG obtained to assist in diagnosis and management of assessed problem(s)] : EKG obtained to assist in diagnosis and management of assessed problem(s) [FreeTextEntry1] : Patient is a 67 year-old gentleman with known family history of coronary artery disease presents with an ECG change.\par Echocardiogram to evaluate for cardiomyopathy.\par Nuclear stress test to evaluate for ischemic heart disease.

## 2022-08-30 ENCOUNTER — APPOINTMENT (OUTPATIENT)
Dept: CARDIOLOGY | Facility: CLINIC | Age: 67
End: 2022-08-30

## 2022-08-30 PROCEDURE — 93306 TTE W/DOPPLER COMPLETE: CPT

## 2022-09-15 ENCOUNTER — APPOINTMENT (OUTPATIENT)
Dept: CARDIOLOGY | Facility: CLINIC | Age: 67
End: 2022-09-15

## 2022-09-15 PROCEDURE — 78452 HT MUSCLE IMAGE SPECT MULT: CPT

## 2022-09-15 PROCEDURE — A9500: CPT

## 2022-09-15 PROCEDURE — 93015 CV STRESS TEST SUPVJ I&R: CPT

## 2022-09-25 ENCOUNTER — OUTPATIENT (OUTPATIENT)
Dept: OUTPATIENT SERVICES | Facility: HOSPITAL | Age: 67
LOS: 1 days | End: 2022-09-25
Payer: MEDICARE

## 2022-09-25 DIAGNOSIS — Z11.52 ENCOUNTER FOR SCREENING FOR COVID-19: ICD-10-CM

## 2022-09-25 DIAGNOSIS — Z98.52 VASECTOMY STATUS: Chronic | ICD-10-CM

## 2022-09-25 LAB — SARS-COV-2 RNA SPEC QL NAA+PROBE: SIGNIFICANT CHANGE UP

## 2022-09-25 PROCEDURE — U0003: CPT

## 2022-09-25 PROCEDURE — U0005: CPT

## 2022-09-25 PROCEDURE — C9803: CPT

## 2022-09-28 ENCOUNTER — OUTPATIENT (OUTPATIENT)
Dept: OUTPATIENT SERVICES | Facility: HOSPITAL | Age: 67
LOS: 1 days | End: 2022-09-28
Payer: MEDICARE

## 2022-09-28 ENCOUNTER — TRANSCRIPTION ENCOUNTER (OUTPATIENT)
Age: 67
End: 2022-09-28

## 2022-09-28 VITALS
DIASTOLIC BLOOD PRESSURE: 78 MMHG | TEMPERATURE: 98 F | HEART RATE: 75 BPM | WEIGHT: 210.1 LBS | SYSTOLIC BLOOD PRESSURE: 163 MMHG | RESPIRATION RATE: 16 BRPM | OXYGEN SATURATION: 99 %

## 2022-09-28 VITALS
DIASTOLIC BLOOD PRESSURE: 81 MMHG | RESPIRATION RATE: 16 BRPM | OXYGEN SATURATION: 98 % | SYSTOLIC BLOOD PRESSURE: 129 MMHG | HEART RATE: 52 BPM

## 2022-09-28 DIAGNOSIS — R94.39 ABNORMAL RESULT OF OTHER CARDIOVASCULAR FUNCTION STUDY: ICD-10-CM

## 2022-09-28 DIAGNOSIS — Z98.52 VASECTOMY STATUS: Chronic | ICD-10-CM

## 2022-09-28 LAB
ANION GAP SERPL CALC-SCNC: 11 MMOL/L — SIGNIFICANT CHANGE UP (ref 5–17)
BUN SERPL-MCNC: 13 MG/DL — SIGNIFICANT CHANGE UP (ref 7–23)
CALCIUM SERPL-MCNC: 9.4 MG/DL — SIGNIFICANT CHANGE UP (ref 8.4–10.5)
CHLORIDE SERPL-SCNC: 107 MMOL/L — SIGNIFICANT CHANGE UP (ref 96–108)
CO2 SERPL-SCNC: 25 MMOL/L — SIGNIFICANT CHANGE UP (ref 22–31)
CREAT SERPL-MCNC: 1.1 MG/DL — SIGNIFICANT CHANGE UP (ref 0.5–1.3)
EGFR: 74 ML/MIN/1.73M2 — SIGNIFICANT CHANGE UP
GLUCOSE SERPL-MCNC: 98 MG/DL — SIGNIFICANT CHANGE UP (ref 70–99)
HCT VFR BLD CALC: 40.8 % — SIGNIFICANT CHANGE UP (ref 39–50)
HGB BLD-MCNC: 14.1 G/DL — SIGNIFICANT CHANGE UP (ref 13–17)
MCHC RBC-ENTMCNC: 30.6 PG — SIGNIFICANT CHANGE UP (ref 27–34)
MCHC RBC-ENTMCNC: 34.6 GM/DL — SIGNIFICANT CHANGE UP (ref 32–36)
MCV RBC AUTO: 88.5 FL — SIGNIFICANT CHANGE UP (ref 80–100)
NRBC # BLD: 0 /100 WBCS — SIGNIFICANT CHANGE UP (ref 0–0)
PLATELET # BLD AUTO: 177 K/UL — SIGNIFICANT CHANGE UP (ref 150–400)
POTASSIUM SERPL-MCNC: 3.6 MMOL/L — SIGNIFICANT CHANGE UP (ref 3.5–5.3)
POTASSIUM SERPL-SCNC: 3.6 MMOL/L — SIGNIFICANT CHANGE UP (ref 3.5–5.3)
RBC # BLD: 4.61 M/UL — SIGNIFICANT CHANGE UP (ref 4.2–5.8)
RBC # FLD: 12.5 % — SIGNIFICANT CHANGE UP (ref 10.3–14.5)
SODIUM SERPL-SCNC: 143 MMOL/L — SIGNIFICANT CHANGE UP (ref 135–145)
WBC # BLD: 6.87 K/UL — SIGNIFICANT CHANGE UP (ref 3.8–10.5)
WBC # FLD AUTO: 6.87 K/UL — SIGNIFICANT CHANGE UP (ref 3.8–10.5)

## 2022-09-28 PROCEDURE — 36415 COLL VENOUS BLD VENIPUNCTURE: CPT

## 2022-09-28 PROCEDURE — 99152 MOD SED SAME PHYS/QHP 5/>YRS: CPT

## 2022-09-28 PROCEDURE — 93458 L HRT ARTERY/VENTRICLE ANGIO: CPT

## 2022-09-28 PROCEDURE — C1769: CPT

## 2022-09-28 PROCEDURE — 80048 BASIC METABOLIC PNL TOTAL CA: CPT

## 2022-09-28 PROCEDURE — 85027 COMPLETE CBC AUTOMATED: CPT

## 2022-09-28 PROCEDURE — C1887: CPT

## 2022-09-28 PROCEDURE — 93458 L HRT ARTERY/VENTRICLE ANGIO: CPT | Mod: 26

## 2022-09-28 PROCEDURE — C1894: CPT

## 2022-09-28 PROCEDURE — 93005 ELECTROCARDIOGRAM TRACING: CPT | Mod: XU

## 2022-09-28 PROCEDURE — 93010 ELECTROCARDIOGRAM REPORT: CPT

## 2022-09-28 RX ORDER — SODIUM CHLORIDE 9 MG/ML
250 INJECTION INTRAMUSCULAR; INTRAVENOUS; SUBCUTANEOUS ONCE
Refills: 0 | Status: DISCONTINUED | OUTPATIENT
Start: 2022-09-28 | End: 2022-10-12

## 2022-09-28 RX ORDER — ATORVASTATIN CALCIUM 80 MG/1
1 TABLET, FILM COATED ORAL
Qty: 0 | Refills: 0 | DISCHARGE

## 2022-09-28 RX ORDER — SODIUM CHLORIDE 9 MG/ML
1000 INJECTION INTRAMUSCULAR; INTRAVENOUS; SUBCUTANEOUS
Refills: 0 | Status: DISCONTINUED | OUTPATIENT
Start: 2022-09-28 | End: 2022-10-12

## 2022-09-28 RX ORDER — MELOXICAM 15 MG/1
1 TABLET ORAL
Qty: 0 | Refills: 0 | DISCHARGE
End: 2022-09-27

## 2022-09-28 RX ORDER — POTASSIUM CHLORIDE 20 MEQ
40 PACKET (EA) ORAL ONCE
Refills: 0 | Status: DISCONTINUED | OUTPATIENT
Start: 2022-09-28 | End: 2022-10-12

## 2022-09-28 RX ORDER — ALFUZOSIN HYDROCHLORIDE 10 MG/1
1 TABLET, EXTENDED RELEASE ORAL
Qty: 0 | Refills: 0 | DISCHARGE

## 2022-09-28 NOTE — ASU DISCHARGE PLAN (ADULT/PEDIATRIC) - CARE PROVIDER_API CALL
Johnathan Euceda)  Cardiology; Internal Medicine  [address], [address]  [place] [address]  Phone: [phone]  Fax: [phone]  Established Patient  Follow Up Time: 2 weeks

## 2022-09-28 NOTE — H&P CARDIOLOGY - HISTORY OF PRESENT ILLNESS
67 year old male with PMH HLD. hematuria, kidney stones, skin cancer, ED, s/p septoplasty, s/p vasectomy. + Family hx of early CAD. ECG during routine physical revealed T-wave inversions in lateral leads, voltage c/w LVH. Never had SOB or CP. Referred to Dr Johnathan Euceda. Now sent for [facility] d/t abnormal stress test (done 9/15/22, results not available). 9/6/22 TTE with EF 64%. Denies CP, SOB, palpitations, N/V, fever/chills, abd pain, numbness/tingling/weakness, other c/o. No implanted cardiac monitoring devices.

## 2022-09-28 NOTE — ASU PATIENT PROFILE, ADULT - FALL HARM RISK - UNIVERSAL INTERVENTIONS
Bed in lowest position, wheels locked, appropriate side rails in place/Call bell, personal items and telephone in reach/Instruct patient to call for assistance before getting out of bed or chair/Non-slip footwear when patient is out of bed/[place] to call system/Physically safe environment - no spills, clutter or unnecessary equipment/Purposeful Proactive Rounding/Room/bathroom lighting operational, light cord in reach

## 2022-09-28 NOTE — ASU DISCHARGE PLAN (ADULT/PEDIATRIC) - NS MD DC FALL RISK RISK
For information on Fall & Injury Prevention, visit: [email or web address] OR  [email or web address] OR  https://www.cdc.gov/steadi/patient.html

## 2022-09-28 NOTE — ASU PATIENT PROFILE, ADULT - DOMESTIC TRAVEL HIGH RISK QUESTION
Pt presents today with c/o dysuria, urinary urgency/frequency since Sunday. Pt denies any hematuria or fevers.
No

## 2022-09-29 ENCOUNTER — NON-APPOINTMENT (OUTPATIENT)
Age: 67
End: 2022-09-29

## 2022-10-10 ENCOUNTER — APPOINTMENT (OUTPATIENT)
Dept: CARDIOLOGY | Facility: CLINIC | Age: 67
End: 2022-10-10

## 2022-10-10 VITALS
OXYGEN SATURATION: 96 % | DIASTOLIC BLOOD PRESSURE: 70 MMHG | BODY MASS INDEX: 29.57 KG/M2 | SYSTOLIC BLOOD PRESSURE: 120 MMHG | WEIGHT: 212 LBS | HEART RATE: 76 BPM

## 2022-10-10 DIAGNOSIS — R94.39 ABNORMAL RESULT OF OTHER CARDIOVASCULAR FUNCTION STUDY: ICD-10-CM

## 2022-10-10 PROCEDURE — 99214 OFFICE O/P EST MOD 30 MIN: CPT

## 2022-10-18 ENCOUNTER — APPOINTMENT (OUTPATIENT)
Dept: CT IMAGING | Facility: CLINIC | Age: 67
End: 2022-10-18

## 2022-10-18 ENCOUNTER — OUTPATIENT (OUTPATIENT)
Dept: OUTPATIENT SERVICES | Facility: HOSPITAL | Age: 67
LOS: 1 days | End: 2022-10-18
Payer: MEDICARE

## 2022-10-18 DIAGNOSIS — Z00.8 ENCOUNTER FOR OTHER GENERAL EXAMINATION: ICD-10-CM

## 2022-10-18 DIAGNOSIS — Z98.52 VASECTOMY STATUS: Chronic | ICD-10-CM

## 2022-10-18 PROCEDURE — 74170 CT ABD WO CNTRST FLWD CNTRST: CPT

## 2022-10-18 PROCEDURE — 74170 CT ABD WO CNTRST FLWD CNTRST: CPT | Mod: 26,MH

## 2022-10-21 ENCOUNTER — TRANSCRIPTION ENCOUNTER (OUTPATIENT)
Age: 67
End: 2022-10-21

## 2022-10-21 NOTE — REASON FOR VISIT
[Arrhythmia/ECG Abnorrmalities] : arrhythmia/ECG abnormalities [FreeTextEntry3] : Boo Hung MD [FreeTextEntry1] : 10/10/2022.\par Johnathan Johns returns today to review the results of his recent cardiac catheterization.\par He was initially referred to this office for an abnormal ECG and underwent subsequent stress testing. Nuclear imaging revealed medium sized mild defects in the inferior and inferolateral walls that were partially reversible and consistent with infarction with moderate krishna-infarct ischemia. A left heart cath performed on 9/28/2022 revealed only mild luminal irregularities.\par Today he is feeling well. He continues to play basketball several times a week and is not limited by any chest discomfort or shortness of breath. \par We have reviewed his medications and he is taking all as directed, including nightly atorvastatin.

## 2022-10-21 NOTE — END OF VISIT
[Time Spent: ___ minutes] : I have spent [unfilled] minutes of time on the encounter. [FreeTextEntry3] : I saw the patient with Melanie Kitchen NP and I agree with the findings and plan as above.

## 2022-10-21 NOTE — CARDIOLOGY SUMMARY
[de-identified] : 8/29/2022, sinus 64 bpm, LVH with strain pattern - the strain pattern was not previously noted on 2021 ECG [de-identified] : 9/15/2022, 10 minutes of Vikram protocol (11 METS), 95% MPHR, inferior and inferolateral infarct with krishna-infarct ischemia, LVEF 59%, LVEDV 11 mL [de-identified] : 8/30/2022, mildly dilated LA, normal pulmonary pressures, normal LV systolic function, 60-65% [de-identified] : 9/28/2022 with David Vargas MD at [facility] - minor luminal irregularities

## 2022-10-21 NOTE — DISCUSSION/SUMMARY
[FreeTextEntry1] : Patient is a 67 year-old gentleman with known family history of coronary artery disease presents for follow up after recent cardiac catheterization.\par \par We reviewed his recent ECG, nuclear stress test and angiogram results and he has been reassured that from a cardiovascular standpoint he is doing very well.\par His blood pressure remains within normal limits.\par He will continue nightly atorvastatin for cardiovascular risk reduction.\par Encouraged continued routine aerobic exercise.\par \par Annual follow up, or sooner if symptoms arise.

## 2022-10-25 ENCOUNTER — TRANSCRIPTION ENCOUNTER (OUTPATIENT)
Age: 67
End: 2022-10-25

## 2022-11-02 NOTE — DATA REVIEWED
[FreeTextEntry1] : EKG - sinus dai @ 58, nml axis, TWIs noted in lead III, lateral leads, V1 - change compared to prior

## 2022-11-02 NOTE — PHYSICAL EXAM
[No Acute Distress] : no acute distress [Well Nourished] : well nourished [Well Developed] : well developed [Well-Appearing] : well-appearing [Normal Voice/Communication] : normal voice/communication [Normal Sclera/Conjunctiva] : normal sclera/conjunctiva [PERRL] : pupils equal round and reactive to light [Normal Outer Ear/Nose] : the outer ears and nose were normal in appearance [Normal Oropharynx] : the oropharynx was normal [Normal TMs] : both tympanic membranes were normal [No JVD] : no jugular venous distention [Supple] : supple [No Lymphadenopathy] : no lymphadenopathy [Thyroid Normal, No Nodules] : the thyroid was normal and there were no nodules present [No Respiratory Distress] : no respiratory distress  [Clear to Auscultation] : lungs were clear to auscultation bilaterally [No Accessory Muscle Use] : no accessory muscle use [Normal Rate] : normal rate  [Regular Rhythm] : with a regular rhythm [Normal S1, S2] : normal S1 and S2 [No Murmur] : no murmur heard [No Carotid Bruits] : no carotid bruits [No Abdominal Bruit] : a ~M bruit was not heard ~T in the abdomen [No Varicosities] : no varicosities [Pedal Pulses Present] : the pedal pulses are present [No Edema] : there was no peripheral edema [No Extremity Clubbing/Cyanosis] : no extremity clubbing/cyanosis [No Palpable Aorta] : no palpable aorta [Soft] : abdomen soft [Non Tender] : non-tender [Non-distended] : non-distended [No Masses] : no abdominal mass palpated [No HSM] : no HSM [Normal Bowel Sounds] : normal bowel sounds [Declined Rectal Exam] : declined rectal exam [Normal Supraclavicular Nodes] : no supraclavicular lymphadenopathy [Normal Posterior Cervical Nodes] : no posterior cervical lymphadenopathy [Normal Anterior Cervical Nodes] : no anterior cervical lymphadenopathy [No CVA Tenderness] : no CVA  tenderness [No Spinal Tenderness] : no spinal tenderness [No Joint Swelling] : no joint swelling [Grossly Normal Strength/Tone] : grossly normal strength/tone [No Rash] : no rash [Normal Gait] : normal gait [Coordination Grossly Intact] : coordination grossly intact [No Focal Deficits] : no focal deficits [Deep Tendon Reflexes (DTR)] : deep tendon reflexes were 2+ and symmetric [Speech Grossly Normal] : speech grossly normal [Normal Affect] : the affect was normal [Alert and Oriented x3] : oriented to person, place, and time [Normal Mood] : the mood was normal [Normal Insight/Judgement] : insight and judgment were intact [de-identified] : minimal b/l cerumen [FreeTextEntry1] : done w urology  [de-identified] :  multiple skin lesions on back, chest unchanged

## 2022-11-02 NOTE — REVIEW OF SYSTEMS
[Negative] : Heme/Lymph [Dysuria] : no dysuria [Incontinence] : no incontinence [Hesitancy] : no hesitancy [Hematuria] : no hematuria [Frequency] : no frequency [Mole Changes] : no mole changes [de-identified] : b/l inner ear dermatitis unchanged

## 2022-11-02 NOTE — HEALTH RISK ASSESSMENT
[No] : In the past 12 months have you used drugs other than those required for medical reasons? No [No falls in past year] : Patient reported no falls in the past year [0] : 2) Feeling down, depressed, or hopeless: Not at all (0) [None] : None [] :  [Never] : Never [Employed] : employed [Fully functional (using the telephone, shopping, preparing meals, housekeeping, doing laundry, using] : Fully functional and needs no help or supervision to perform IADLs (using the telephone, shopping, preparing meals, housekeeping, doing laundry, using transportation, managing medications and managing finances) [ColonoscopyDate] : 09/20 [ColonoscopyComments] : - polyps

## 2022-11-02 NOTE — HISTORY OF PRESENT ILLNESS
[FreeTextEntry1] : \par 68 y/o man presents for annual physical exam. he feels well currently w no new concerns. no recent illnesses.\par \par still enjoys playing basketball \par \par he had COVID vaccines x 4 doses \par  \par hyperlipidemia well controlled on low dose statin \par \par follows w dermatology yearly for multiple skin lesions, no skin cancer detected  \par \par following w Dr Mckeon urology for tx of LUTS, now improved on alfuzosin, hx of renal stones. abnormal L renal cyst stable on imaging and continuing to monitor \par PSA screening completed recently \par \par screening colonoscopy completed in 9/20 w Dr Wade, multiple polyps, TAs. repeat in 5 years advised

## 2022-11-02 NOTE — ASSESSMENT
[FreeTextEntry1] : discussed w pt \par \par reviewed updated hx \par \par he is doing well, exercising regularly\par \par cont current rx\par \par check routine labs as below \par \par urology f/u as scheduled , PSA screening completed \par \par cont diet control, exercise, weight control \par \par dermatology f/u yearly \par \par screening colonoscopy updated in 2020, benign polyps, repeat 5 years \par \par vaccinations reviewed. COVID vaccines UTD. Shingrix vaccines UTD. discussed Prevnar 20 vaccination and administered today \par \par discussed EKG changes compared to prior, noting lateral TWIs. he is completely asymptomatic . will refer for cardiology evaluation as precaution, monitor progress, call prn if any concerns \par \par RTO 6 months for routine f/u or earlier prn if any new concerns  \par

## 2022-11-10 LAB
ALBUMIN SERPL ELPH-MCNC: 5.3 G/DL
ALP BLD-CCNC: 71 U/L
ALT SERPL-CCNC: 28 U/L
ANION GAP SERPL CALC-SCNC: 13 MMOL/L
AST SERPL-CCNC: 23 U/L
BASOPHILS # BLD AUTO: 0.04 K/UL
BASOPHILS NFR BLD AUTO: 0.5 %
BILIRUB SERPL-MCNC: 0.9 MG/DL
BUN SERPL-MCNC: 15 MG/DL
CALCIUM SERPL-MCNC: 10 MG/DL
CHLORIDE SERPL-SCNC: 103 MMOL/L
CHOLEST SERPL-MCNC: 174 MG/DL
CO2 SERPL-SCNC: 26 MMOL/L
CREAT SERPL-MCNC: 1.17 MG/DL
EGFR: 69 ML/MIN/1.73M2
EOSINOPHIL # BLD AUTO: 0.09 K/UL
EOSINOPHIL NFR BLD AUTO: 1.2 %
ESTIMATED AVERAGE GLUCOSE: 114 MG/DL
GLUCOSE SERPL-MCNC: 95 MG/DL
HBA1C MFR BLD HPLC: 5.6 %
HCT VFR BLD CALC: 41.3 %
HDLC SERPL-MCNC: 40 MG/DL
HGB BLD-MCNC: 14.2 G/DL
IMM GRANULOCYTES NFR BLD AUTO: 0.3 %
LDLC SERPL CALC-MCNC: 100 MG/DL
LYMPHOCYTES # BLD AUTO: 2.11 K/UL
LYMPHOCYTES NFR BLD AUTO: 27.8 %
MAN DIFF?: NORMAL
MCHC RBC-ENTMCNC: 30.5 PG
MCHC RBC-ENTMCNC: 34.4 GM/DL
MCV RBC AUTO: 88.8 FL
MONOCYTES # BLD AUTO: 0.64 K/UL
MONOCYTES NFR BLD AUTO: 8.4 %
NEUTROPHILS # BLD AUTO: 4.7 K/UL
NEUTROPHILS NFR BLD AUTO: 61.8 %
NONHDLC SERPL-MCNC: 134 MG/DL
PLATELET # BLD AUTO: 179 K/UL
POTASSIUM SERPL-SCNC: 4 MMOL/L
PROT SERPL-MCNC: 7.9 G/DL
RBC # BLD: 4.65 M/UL
RBC # FLD: 13 %
SODIUM SERPL-SCNC: 143 MMOL/L
T4 FREE SERPL-MCNC: 1 NG/DL
TRIGL SERPL-MCNC: 166 MG/DL
TSH SERPL-ACNC: 2.32 UIU/ML
WBC # FLD AUTO: 7.6 K/UL

## 2023-01-09 ENCOUNTER — RX RENEWAL (OUTPATIENT)
Age: 68
End: 2023-01-09

## 2023-02-07 ENCOUNTER — RX RENEWAL (OUTPATIENT)
Age: 68
End: 2023-02-07

## 2023-05-09 ENCOUNTER — APPOINTMENT (OUTPATIENT)
Dept: UROLOGY | Facility: CLINIC | Age: 68
End: 2023-05-09
Payer: MEDICARE

## 2023-05-09 PROCEDURE — 51798 US URINE CAPACITY MEASURE: CPT

## 2023-05-09 PROCEDURE — 51741 ELECTRO-UROFLOWMETRY FIRST: CPT

## 2023-05-09 PROCEDURE — 99214 OFFICE O/P EST MOD 30 MIN: CPT

## 2023-05-09 RX ORDER — SILDENAFIL 50 MG/1
50 TABLET ORAL
Qty: 10 | Refills: 5 | Status: ACTIVE | COMMUNITY
Start: 2022-05-19 | End: 1900-01-01

## 2023-05-09 NOTE — HISTORY OF PRESENT ILLNESS
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 penile curvature\par 2. decreased strength of urinary stream\par \par Patient notes curvature to the left which has been stable x 2 years\par Does no cause erectile dysfunction\par Causes wife discomfort\par wife utilizes lubrication \par \par Urinary symptoms mild nocturia and decreased strength of urination\par \par 10.28.2020\par patient returns for:\par 1. renal ultrasound re stone history\par 2. DUS re penile curvature\par \par 4.14.2021\par returns on alfuzosin\par IPSS 7 (markedly improved and pleased)\par BILL 22\par returns re \par 1. LUTS\par 2. PEYRONIES\par 3. COMPLEX CYSt\par 4. ERECTILE DYSUNCTION\par \par 5.6.2021\par returns for renal ultrasound and management\par \par 10.8.2021\par IPSS 4\par BILL 20 \par penile curvature stable\par renal complex cyst\par \par 5.14.2022\par returns for ultrasound and management of erectile dysfunction\par curvature improved\par not problematic\par variable erections ; at times good function at times decreased rigidify\par wife "feels it could be better"\par \par 5.9.2023\par returns\par ED much improved\par utilizing viagra rarely generally does not need\par partner satisfied\par IPSS 1\par BILL 19

## 2023-05-09 NOTE — ASSESSMENT
[FreeTextEntry1] : Mr. Rosenbaum is a 65-year-old gentleman.  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  This appears to be stable.  It causes him no erectile dysfunction.  However, he notes his wife does complain of pain with sexual intercourse.  She does have difficulty with lubrication however with artificial lubrication she continues to have discomfort.  She states that "it is sitting in a different spot" this causes her discomfort.  They have attempted different positions without the resolution of this complaint.  On examination his penis is 11 cm in length.  There is mild induration on the left side consistent with possible Peyronie's disease.  He has no history of trauma.  He takes no medications associated with Peyronie's disease.  He has no evidence of Dupuytren's contractures in his palms.\par Discussed Peyronies disease; pathology; natural history and treatment options.\par Patient has symptomatic new lesion without curvature.\par Discussed oral medication and lack of proven efficacy.\par Discussed ILV (intralesional verpamil)\par Discussed xiaflex\par Discussed proceeding with DUS \par \par Patient also complains of mild urinary symptoms including nocturia 1-2 times per night and decreased urinary stream.  We discussed the utilization of alpha blockers he is interested in medical management.\par Pt understands that some of the most common side effect  of this medication include dizziness, dry mouth, fatigue, lightheadedness, palpitations. Pt informed to stop the medication should any of these occur.  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\par Will proceed with Uroxatral\par \par History renal stones which were passed spontaneously\par Will proceed with renal ultrasound at next visit.\par \par \par \par 10.28.2020\par renal ultrasound demonstrates vascular / mass interpolar in the left kidney with a complex cyst kidney\par these findings were reviewed and demonstrated to the patient\par the importance of a CT urogram was emphasized\par The MONICA FRYE  expressed fully understanding of the information provided, the consequences and the management.\par \par penile curvature mild curvature was noted to the left\par blood flow appeared to be normal although rigidity was not sufficient to maximize erection\par patient was anxious regarding renal findings and was not reinjected\par He had been advised prior to DUS to defer in light of renal findings but chose to proceed.\par FULL detumescence was documented  prior to leaving office\par \par He will return for followup after CT and proceed with DUS i\par \par \par 4.14.2021\par returns\par had CT in 11.2020\par 2.7 cm bosniak 2 cyst\par no solid mass\par scheduled for repeat CT 5/6\par \par continues to complain of penile curvature\par attempted DUS \par did not respond to injection\par patient demonstrated photos\par penile curvature is mild \par options reviewed\par discussed issues of lubrication since curvature is mild\par RESTOREX reviewed \par Will attempt\par \par LUTS improved on medication; continue current regimen\par \par PSA to be checked\par patient wants to proceed\par \par 5.6.2021\par PSA reviewed\par \par Renal USG today demonstrates column of Mohinder and complex cyst\par Size of cyst not changed \par Reviewed prior CT and inner septations which enhance\par Discussed significance \par Discussed proceeding with repeat CT in 3 months \par Patient wants to proceed "i don’t want to take a chance"\par \par Peyronies disease discussed.  As discussed at  last visit, issues related to partner lubrication and dyspareunia rather than degree of curvature.  Partner has utilized lubricant and couple has been able to engage in satisfactory \par sexual intercourse .  Pleased \par \par \par 5.14.2022\par returns for ultrasound and management of erectile dysfunction\par curvature improved\par not problematic\par variable erections ; at times good function at times decreased rigidify\par wife "feels it could be better" \par \par Patient is currently experiencing see HPI and nocturia, but no urinary retention, no urinary urgency, no urinary frequency, no straining and no weak stream. \par  \par \par 5.9.2023\par Mr. Stoddard returns on alfuzosin and Viagra.  He rarely needs the Viagra.  He states his erections are excellent.  The curvature is inconsequential.  His sexual activity is satisfactory to he and his partner.\par \par He is urinary symptoms are markedly improved on alfuzosin.  His IPSS score is 1.  His flow is 28.5 cc/s with a postvoid residual of 24 cc.  This is excellent.\par \par We discussed the need for a follow-up CT scan in light of the complex renal cyst which we are following.  This is a 6-month follow-up CT.\par \par Plan:\par PSA today.\par 2.  Renewal alfuzosin\par 3.  Renewal Viagra\par 4.  CT follow-up re: complex renal cyst\par 5.  Follow-up in 6 months

## 2023-05-09 NOTE — PHYSICAL EXAM
[Abdomen Tenderness] : non-tender [] : no hepato-splenomegaly [Abdomen Mass (___ Cm)] : no abdominal mass palpated [Abdomen Hernia] : no hernia was discovered [Costovertebral Angle Tenderness] : no ~M costovertebral angle tenderness [Urethral Meatus] : meatus normal [Penis Abnormality] : normal circumcised penis [Epididymis] : the epididymides were normal [Testes Tenderness] : no tenderness of the testes [Testes Mass (___cm)] : there were no testicular masses [Prostate Enlargement] : the prostate was not enlarged [Prostate Tenderness] : the prostate was not tender [FreeTextEntry1] : flow max 28.5 cc/sec; volume 170;  PVR 24

## 2023-05-09 NOTE — LETTER BODY
[FreeTextEntry2] : Boo Hung MD [FreeTextEntry1] :  \par Dear Doctor,\par \par \par I had the opportunity to see your patient, Mr. MONICA FRYE in followup. I am enclosing my office note for your information.\par \par I will keep you informed of any developments.\par \par Feel free to contact me if you have any questions.\par \par Sincerely,\par \par Alessandro Mckeon MD, FACS\par Professor of Urology\par [facility]\par \par [address]\par [place], [place] [address]\par \par [address]\par [place], [place] [address]\par \par Office Telephone \par [phone]\par \par Fax\par [phone]\par

## 2023-05-10 ENCOUNTER — TRANSCRIPTION ENCOUNTER (OUTPATIENT)
Age: 68
End: 2023-05-10

## 2023-05-10 DIAGNOSIS — R82.81 PYURIA: ICD-10-CM

## 2023-05-10 LAB
APPEARANCE: CLEAR
BACTERIA: NEGATIVE /HPF
BILIRUBIN URINE: NEGATIVE
BLOOD URINE: NEGATIVE
CAST: 0 /LPF
COLOR: YELLOW
EPITHELIAL CELLS: 0 /HPF
GLUCOSE QUALITATIVE U: NEGATIVE MG/DL
KETONES URINE: NEGATIVE MG/DL
LEUKOCYTE ESTERASE URINE: ABNORMAL
MICROSCOPIC-UA: NORMAL
NITRITE URINE: NEGATIVE
PH URINE: 5.5
PROTEIN URINE: NEGATIVE MG/DL
PSA SERPL-MCNC: 0.83 NG/ML
RED BLOOD CELLS URINE: 2 /HPF
SPECIFIC GRAVITY URINE: 1.03
UROBILINOGEN URINE: 0.2 MG/DL
WHITE BLOOD CELLS URINE: 6 /HPF

## 2023-05-12 ENCOUNTER — TRANSCRIPTION ENCOUNTER (OUTPATIENT)
Age: 68
End: 2023-05-12

## 2023-05-12 LAB
APPEARANCE: CLEAR
BACTERIA: NEGATIVE /HPF
BILIRUBIN URINE: NEGATIVE
BLOOD URINE: NEGATIVE
CAST: 0 /LPF
COLOR: YELLOW
EPITHELIAL CELLS: 0 /HPF
GLUCOSE QUALITATIVE U: NEGATIVE MG/DL
KETONES URINE: ABNORMAL MG/DL
LEUKOCYTE ESTERASE URINE: NEGATIVE
MICROSCOPIC-UA: NORMAL
NITRITE URINE: NEGATIVE
PH URINE: 6
PROTEIN URINE: NEGATIVE MG/DL
RED BLOOD CELLS URINE: 1 /HPF
SPECIFIC GRAVITY URINE: 1.02
UROBILINOGEN URINE: 1 MG/DL
WHITE BLOOD CELLS URINE: 0 /HPF

## 2023-05-15 LAB — BACTERIA UR CULT: NORMAL

## 2023-05-19 ENCOUNTER — APPOINTMENT (OUTPATIENT)
Dept: CT IMAGING | Facility: IMAGING CENTER | Age: 68
End: 2023-05-19
Payer: MEDICARE

## 2023-05-19 ENCOUNTER — OUTPATIENT (OUTPATIENT)
Dept: OUTPATIENT SERVICES | Facility: HOSPITAL | Age: 68
LOS: 1 days | End: 2023-05-19
Payer: MEDICARE

## 2023-05-19 DIAGNOSIS — Z12.5 ENCOUNTER FOR SCREENING FOR MALIGNANT NEOPLASM OF PROSTATE: ICD-10-CM

## 2023-05-19 DIAGNOSIS — Z98.52 VASECTOMY STATUS: Chronic | ICD-10-CM

## 2023-05-19 PROCEDURE — 74170 CT ABD WO CNTRST FLWD CNTRST: CPT | Mod: 26,MH

## 2023-05-19 PROCEDURE — 74170 CT ABD WO CNTRST FLWD CNTRST: CPT

## 2023-08-08 ENCOUNTER — APPOINTMENT (OUTPATIENT)
Dept: INTERNAL MEDICINE | Facility: CLINIC | Age: 68
End: 2023-08-08
Payer: MEDICARE

## 2023-08-08 VITALS
WEIGHT: 214 LBS | DIASTOLIC BLOOD PRESSURE: 74 MMHG | HEIGHT: 71 IN | HEART RATE: 66 BPM | TEMPERATURE: 97.8 F | BODY MASS INDEX: 29.96 KG/M2 | OXYGEN SATURATION: 98 % | SYSTOLIC BLOOD PRESSURE: 112 MMHG

## 2023-08-08 DIAGNOSIS — Z00.00 ENCOUNTER FOR GENERAL ADULT MEDICAL EXAMINATION W/OUT ABNORMAL FINDINGS: ICD-10-CM

## 2023-08-08 DIAGNOSIS — E78.5 HYPERLIPIDEMIA, UNSPECIFIED: ICD-10-CM

## 2023-08-08 PROCEDURE — 93000 ELECTROCARDIOGRAM COMPLETE: CPT

## 2023-08-08 PROCEDURE — G0439: CPT

## 2023-08-08 PROCEDURE — 36415 COLL VENOUS BLD VENIPUNCTURE: CPT

## 2023-08-08 NOTE — ASSESSMENT
[FreeTextEntry1] : discussed w pt   reviewed updated hx  cardiac catheterization completed, no concerning findings. continuing statin for control of lipids   cont current rx  check routine labs as below   urology f/u as scheduled , PSA screening completed recently, cont to monitor renal cyst   cont diet control, exercise, weight control   dermatology f/u yearly   screening colonoscopy updated in 2020, benign polyps, repeat 5 years   vaccinations reviewed, all UTD   RTO yearly for routine exam or earlier prn if any new concerns

## 2023-08-08 NOTE — HEALTH RISK ASSESSMENT
[No] : In the past 12 months have you used drugs other than those required for medical reasons? No [No falls in past year] : Patient reported no falls in the past year [0] : 2) Feeling down, depressed, or hopeless: Not at all (0) [PHQ-2 Negative - No further assessment needed] : PHQ-2 Negative - No further assessment needed [None] : None [Employed] : employed [] :  [Fully functional (using the telephone, shopping, preparing meals, housekeeping, doing laundry, using] : Fully functional and needs no help or supervision to perform IADLs (using the telephone, shopping, preparing meals, housekeeping, doing laundry, using transportation, managing medications and managing finances) [Never] : Never [ColonoscopyDate] : 09/20 [ColonoscopyComments] : - polyps

## 2023-08-08 NOTE — REVIEW OF SYSTEMS
[Negative] : Heme/Lymph [Dysuria] : no dysuria [Incontinence] : no incontinence [Hesitancy] : no hesitancy [Hematuria] : no hematuria [Frequency] : no frequency [Mole Changes] : no mole changes [de-identified] : b/l inner ear dermatitis unchanged

## 2023-08-08 NOTE — HISTORY OF PRESENT ILLNESS
[de-identified] : 69 y/o man presents for annual physical exam. he feels well currently w no new concerns. no recent illnesses.  continuing regular exercise, basketball 3-4 times/week   internal hx reviewed. abnormal stress testing last year w Dr Euceda leading to cardiac catheterization which did not show significant CAD, no specific intervention needed. continues statin for hyperlipidemia   recent f/u w urology noted    hyperlipidemia well controlled on low dose statin   follows w dermatology yearly for multiple skin lesions, no skin cancer detected    following w Dr Mckeon urology for tx of LUTS, improved on alfuzosin, hx of renal stones. abnormal L renal cyst stable on imaging and continuing to monitor  PSA screening completed recently   vaccinations reviewed and UTD   screening colonoscopy completed in 9/20 w Dr Wade, multiple polyps, TAs. repeat in 5 years advised

## 2023-08-08 NOTE — PHYSICAL EXAM
[No Acute Distress] : no acute distress [Well Nourished] : well nourished [Well Developed] : well developed [Well-Appearing] : well-appearing [Normal Voice/Communication] : normal voice/communication [Normal Sclera/Conjunctiva] : normal sclera/conjunctiva [PERRL] : pupils equal round and reactive to light [Normal Outer Ear/Nose] : the outer ears and nose were normal in appearance [Normal Oropharynx] : the oropharynx was normal [Normal TMs] : both tympanic membranes were normal [No JVD] : no jugular venous distention [Supple] : supple [No Lymphadenopathy] : no lymphadenopathy [Thyroid Normal, No Nodules] : the thyroid was normal and there were no nodules present [No Respiratory Distress] : no respiratory distress  [Clear to Auscultation] : lungs were clear to auscultation bilaterally [No Accessory Muscle Use] : no accessory muscle use [Normal Rate] : normal rate  [Regular Rhythm] : with a regular rhythm [Normal S1, S2] : normal S1 and S2 [No Murmur] : no murmur heard [No Carotid Bruits] : no carotid bruits [No Abdominal Bruit] : a ~M bruit was not heard ~T in the abdomen [No Varicosities] : no varicosities [Pedal Pulses Present] : the pedal pulses are present [No Edema] : there was no peripheral edema [No Extremity Clubbing/Cyanosis] : no extremity clubbing/cyanosis [No Palpable Aorta] : no palpable aorta [Soft] : abdomen soft [Non Tender] : non-tender [Non-distended] : non-distended [No Masses] : no abdominal mass palpated [No HSM] : no HSM [Normal Bowel Sounds] : normal bowel sounds [Declined Rectal Exam] : declined rectal exam [Normal Supraclavicular Nodes] : no supraclavicular lymphadenopathy [Normal Posterior Cervical Nodes] : no posterior cervical lymphadenopathy [Normal Anterior Cervical Nodes] : no anterior cervical lymphadenopathy [No CVA Tenderness] : no CVA  tenderness [No Spinal Tenderness] : no spinal tenderness [No Joint Swelling] : no joint swelling [Grossly Normal Strength/Tone] : grossly normal strength/tone [No Rash] : no rash [Normal Gait] : normal gait [Coordination Grossly Intact] : coordination grossly intact [No Focal Deficits] : no focal deficits [Deep Tendon Reflexes (DTR)] : deep tendon reflexes were 2+ and symmetric [Speech Grossly Normal] : speech grossly normal [Memory Grossly Normal] : memory grossly normal [Normal Affect] : the affect was normal [Alert and Oriented x3] : oriented to person, place, and time [Normal Mood] : the mood was normal [Normal Insight/Judgement] : insight and judgment were intact [FreeTextEntry1] : done w urology  [de-identified] :  multiple skin lesions on back, chest unchanged , small lipoma midback

## 2023-08-13 ENCOUNTER — RX RENEWAL (OUTPATIENT)
Age: 68
End: 2023-08-13

## 2023-08-29 LAB
ALBUMIN SERPL ELPH-MCNC: 5 G/DL
ALP BLD-CCNC: 66 U/L
ALT SERPL-CCNC: 50 U/L
ANION GAP SERPL CALC-SCNC: 15 MMOL/L
AST SERPL-CCNC: 29 U/L
BILIRUB SERPL-MCNC: 1.1 MG/DL
BUN SERPL-MCNC: 15 MG/DL
CALCIUM SERPL-MCNC: 9.9 MG/DL
CHLORIDE SERPL-SCNC: 104 MMOL/L
CHOLEST SERPL-MCNC: 185 MG/DL
CO2 SERPL-SCNC: 24 MMOL/L
CREAT SERPL-MCNC: 1.1 MG/DL
EGFR: 74 ML/MIN/1.73M2
ESTIMATED AVERAGE GLUCOSE: 114 MG/DL
GLUCOSE SERPL-MCNC: 99 MG/DL
HBA1C MFR BLD HPLC: 5.6 %
HDLC SERPL-MCNC: 40 MG/DL
LDLC SERPL CALC-MCNC: 100 MG/DL
NONHDLC SERPL-MCNC: 145 MG/DL
POTASSIUM SERPL-SCNC: 4.3 MMOL/L
PROT SERPL-MCNC: 8 G/DL
SODIUM SERPL-SCNC: 143 MMOL/L
T4 FREE SERPL-MCNC: 0.9 NG/DL
TRIGL SERPL-MCNC: 265 MG/DL
TSH SERPL-ACNC: 1.65 UIU/ML

## 2023-10-15 ENCOUNTER — NON-APPOINTMENT (OUTPATIENT)
Age: 68
End: 2023-10-15

## 2023-10-18 ENCOUNTER — RX RENEWAL (OUTPATIENT)
Age: 68
End: 2023-10-18

## 2023-10-18 RX ORDER — ATORVASTATIN CALCIUM 20 MG/1
20 TABLET, FILM COATED ORAL
Qty: 90 | Refills: 2 | Status: ACTIVE | COMMUNITY
Start: 2018-03-19 | End: 1900-01-01

## 2023-10-22 ENCOUNTER — NON-APPOINTMENT (OUTPATIENT)
Age: 68
End: 2023-10-22

## 2023-10-28 ENCOUNTER — NON-APPOINTMENT (OUTPATIENT)
Age: 68
End: 2023-10-28

## 2023-11-01 ENCOUNTER — APPOINTMENT (OUTPATIENT)
Dept: INTERNAL MEDICINE | Facility: CLINIC | Age: 68
End: 2023-11-01
Payer: MEDICARE

## 2023-11-01 VITALS
OXYGEN SATURATION: 98 % | BODY MASS INDEX: 28.7 KG/M2 | DIASTOLIC BLOOD PRESSURE: 70 MMHG | HEIGHT: 71 IN | HEART RATE: 85 BPM | SYSTOLIC BLOOD PRESSURE: 118 MMHG | WEIGHT: 205 LBS | TEMPERATURE: 98.8 F

## 2023-11-01 DIAGNOSIS — J40 BRONCHITIS, NOT SPECIFIED AS ACUTE OR CHRONIC: ICD-10-CM

## 2023-11-01 PROCEDURE — 99213 OFFICE O/P EST LOW 20 MIN: CPT | Mod: 25

## 2023-11-01 RX ORDER — PROMETHAZINE HYDROCHLORIDE AND DEXTROMETHORPHAN HYDROBROMIDE ORAL SOLUTION 15; 6.25 MG/5ML; MG/5ML
6.25-15 SOLUTION ORAL EVERY 8 HOURS
Qty: 1 | Refills: 1 | Status: ACTIVE | COMMUNITY
Start: 2023-11-01 | End: 1900-01-01

## 2023-11-15 ENCOUNTER — RX RENEWAL (OUTPATIENT)
Age: 68
End: 2023-11-15

## 2023-12-26 ENCOUNTER — NON-APPOINTMENT (OUTPATIENT)
Age: 68
End: 2023-12-26

## 2023-12-27 ENCOUNTER — NON-APPOINTMENT (OUTPATIENT)
Age: 68
End: 2023-12-27

## 2023-12-27 ENCOUNTER — APPOINTMENT (OUTPATIENT)
Dept: UROLOGY | Facility: CLINIC | Age: 68
End: 2023-12-27
Payer: MEDICARE

## 2023-12-27 VITALS — TEMPERATURE: 97.3 F

## 2023-12-27 DIAGNOSIS — N52.9 MALE ERECTILE DYSFUNCTION, UNSPECIFIED: ICD-10-CM

## 2023-12-27 DIAGNOSIS — Q55.61 CURVATURE OF PENIS (LATERAL): ICD-10-CM

## 2023-12-27 PROCEDURE — 99214 OFFICE O/P EST MOD 30 MIN: CPT

## 2023-12-27 RX ORDER — SULFAMETHOXAZOLE AND TRIMETHOPRIM 800; 160 MG/1; MG/1
800-160 TABLET ORAL
Qty: 28 | Refills: 0 | Status: ACTIVE | COMMUNITY
Start: 2023-12-27 | End: 1900-01-01

## 2023-12-27 NOTE — HISTORY OF PRESENT ILLNESS
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 penile curvature 2. decreased strength of urinary stream  Patient notes curvature to the left which has been stable x 2 years Does no cause erectile dysfunction Causes wife discomfort wife utilizes lubrication   Urinary symptoms mild nocturia and decreased strength of urination  10.28.2020 patient returns for: 1. renal ultrasound re stone history 2. DUS re penile curvature  4.14.2021 returns on alfuzosin IPSS 7 (markedly improved and pleased) BILL 22 returns re  1. LUTS 2. PEYRONIES 3. COMPLEX CYSt 4. ERECTILE DYSUNCTION  5.6.2021 returns for renal ultrasound and management  10.8.2021 IPSS 4 BILL 20  penile curvature stable renal complex cyst  5.14.2022 returns for ultrasound and management of erectile dysfunction curvature improved not problematic variable erections ; at times good function at times decreased rigidify wife "feels it could be better"  5.9.2023 returns ED much improved utilizing viagra rarely generally does not need partner satisfied IPSS 1 BILL 19  12.27.2023 complaining of "displaced testicle"

## 2023-12-27 NOTE — ASSESSMENT
[FreeTextEntry1] : Mr. Rosenbaum is a 65-year-old gentleman. 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 This appears to be stable. It causes him no erectile dysfunction. However, he notes his wife does complain of pain with sexual intercourse. She does have difficulty with lubrication however with artificial lubrication she continues to have discomfort. She states that "it is sitting in a different spot" this causes her discomfort. They have attempted different positions without the resolution of this complaint. On examination his penis is 11 cm in length. There is mild induration on the left side consistent with possible Peyronie's disease. He has no history of trauma. He takes no medications associated with Peyronie's disease. He has no evidence of Dupuytren's contractures in his palms.  Discussed Peyronies disease; pathology; natural history and treatment options.  Patient has symptomatic new lesion without curvature.  Discussed oral medication and lack of proven efficacy.  Discussed ILV (intralesional verpamil)  Discussed xiaflex  Discussed proceeding with DUS    Patient also complains of mild urinary symptoms including nocturia 1-2 times per night and decreased urinary stream. We discussed the utilization of alpha blockers he is interested in medical management.  Pt understands that some of the most common side effect of this medication include dizziness, dry mouth, fatigue, lightheadedness, palpitations. Pt informed to stop the medication should any of these occur. Discussed "retrograde ejaculation" failure of emission from medication.  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        10.28.2020  renal ultrasound demonstrates vascular / mass interpolar in the left kidney with a complex cyst kidney  these findings were reviewed and demonstrated to the patient  the importance of a CT urogram was emphasized  The [facility] expressed fully understanding of the information provided, the consequences and the management.    penile curvature mild curvature was noted to the left  blood flow appeared to be normal although rigidity was not sufficient to maximize erection  patient was anxious regarding renal findings and was not reinjected  He had been advised prior to DUS to defer in light of renal findings but chose to proceed.  FULL detumescence was documented prior to leaving office    He will return for followup after CT and proceed with DUS i      4.14.2021  returns  had CT in 11.2020  2.7 cm bosniak 2 cyst  no solid mass  scheduled for repeat CT 5/6    continues to complain of penile curvature  attempted DUS  did not respond to injection  patient demonstrated photos  penile curvature is mild  options reviewed  discussed issues of lubrication since curvature is mild  RESTOREX reviewed  Will attempt    LUTS improved on medication; continue current regimen    PSA to be checked  patient wants to proceed    5.6.2021  PSA reviewed    Renal USG today demonstrates column of Mohinder and complex cyst  Size of cyst not changed  Reviewed prior CT and inner septations which enhance  Discussed significance  Discussed proceeding with repeat CT in 3 months  Patient wants to proceed "i don't want to take a chance"    Peyronies disease discussed. As discussed at last visit, issues related to partner lubrication and dyspareunia rather than degree of curvature. Partner has utilized lubricant and couple has been able to engage in satisfactory  sexual intercourse. Pleased      5.14.2022  returns for ultrasound and management of erectile dysfunction  curvature improved  not problematic  variable erections ; at times good function at times decreased rigidify  wife "feels it could be better"    Patient is currently experiencing see HPI and nocturia, but no urinary retention, no urinary urgency, no urinary frequency, no straining and no weak stream.      5.9.2023  Mr. Stoddard returns on alfuzosin and Viagra. He rarely needs the Viagra. He states his erections are excellent. The curvature is inconsequential. His sexual activity is satisfactory to he and his partner.    He is urinary symptoms are markedly improved on alfuzosin. His IPSS score is 1. His flow is 28.5 cc/s with a postvoid residual of 24 cc. This is excellent.    We discussed the need for a follow-up CT scan in light of the complex renal cyst which we are following. This is a 6-month follow-up CT.    Plan:  PSA today.  2. Renewal alfuzosin  3. Renewal Viagra  4. CT follow-up re: complex renal cyst  5. Follow-up in 6 months.        12.27.2023 Patient presents today complaining of a "displaced testicle.  Careful questioning patient endorses the fact that he is aware of increasing testicular swelling on the left side.  He got mild discomfort.  On examination he is epididymitis of the tail of the left testicle with tenderness.  There is no erythema.  He denies any change in his urinary symptoms.  He continues on Uroxatrol.  Penile curvature stable Continues on sildenafil for erecitle function  Plan: 1.  Urinalysis 2 urine culture 3 nonsteroidal anti-inflammatories 4.  Ice 5.  Trimethoprim/sulfamethoxazole 1 tablet twice a day for 2 weeks.  He has no known allergies.  Discussed allergic reaction/stop medications immediately etc 6 . 4-6 weeks ultrasound of testicle

## 2023-12-27 NOTE — PHYSICAL EXAM
[Bowel Sounds] : normal bowel sounds [Abdomen Soft] : soft [Abdomen Tenderness] : non-tender [] : no hepato-splenomegaly [Abdomen Mass (___ Cm)] : no abdominal mass palpated [Abdomen Hernia] : no hernia was discovered [Costovertebral Angle Tenderness] : no ~M costovertebral angle tenderness [Urethral Meatus] : meatus normal [de-identified] : tender/nodular epididymis; no erythema

## 2023-12-28 LAB
APPEARANCE: CLEAR
BACTERIA: NEGATIVE /HPF
BILIRUBIN URINE: NEGATIVE
BLOOD URINE: NEGATIVE
CAST: 0 /LPF
COLOR: YELLOW
EPITHELIAL CELLS: 0 /HPF
GLUCOSE QUALITATIVE U: NEGATIVE MG/DL
KETONES URINE: NEGATIVE MG/DL
LEUKOCYTE ESTERASE URINE: NEGATIVE
MICROSCOPIC-UA: NORMAL
NITRITE URINE: NEGATIVE
PH URINE: 5.5
PROTEIN URINE: NEGATIVE MG/DL
RED BLOOD CELLS URINE: 1 /HPF
SPECIFIC GRAVITY URINE: 1.02
UROBILINOGEN URINE: 0.2 MG/DL
WHITE BLOOD CELLS URINE: 0 /HPF

## 2023-12-29 LAB — BACTERIA UR CULT: NORMAL

## 2024-02-01 ENCOUNTER — APPOINTMENT (OUTPATIENT)
Dept: UROLOGY | Facility: CLINIC | Age: 69
End: 2024-02-01
Payer: MEDICARE

## 2024-02-01 DIAGNOSIS — N45.1 EPIDIDYMITIS: ICD-10-CM

## 2024-02-01 DIAGNOSIS — N28.1 CYST OF KIDNEY, ACQUIRED: ICD-10-CM

## 2024-02-01 PROCEDURE — 99213 OFFICE O/P EST LOW 20 MIN: CPT

## 2024-02-01 PROCEDURE — 76870 US EXAM SCROTUM: CPT

## 2024-02-01 NOTE — ASSESSMENT
[FreeTextEntry1] : Mr. Rosenbaum is a 65-year-old gentleman. 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 This appears to be stable. It causes him no erectile dysfunction. However, he notes his wife does complain of pain with sexual intercourse. She does have difficulty with lubrication however with artificial lubrication she continues to have discomfort. She states that "it is sitting in a different spot" this causes her discomfort. They have attempted different positions without the resolution of this complaint. On examination his penis is 11 cm in length. There is mild induration on the left side consistent with possible Peyronie's disease. He has no history of trauma. He takes no medications associated with Peyronie's disease. He has no evidence of Dupuytren's contractures in his palms.  Discussed Peyronies disease; pathology; natural history and treatment options.  Patient has symptomatic new lesion without curvature.  Discussed oral medication and lack of proven efficacy.  Discussed ILV (intralesional verpamil)  Discussed xiaflex  Discussed proceeding with DUS    Patient also complains of mild urinary symptoms including nocturia 1-2 times per night and decreased urinary stream. We discussed the utilization of alpha blockers he is interested in medical management.  Pt understands that some of the most common side effect of this medication include dizziness, dry mouth, fatigue, lightheadedness, palpitations. Pt informed to stop the medication should any of these occur. Discussed "retrograde ejaculation" failure of emission from medication.  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        10.28.2020  renal ultrasound demonstrates vascular / mass interpolar in the left kidney with a complex cyst kidney  these findings were reviewed and demonstrated to the patient  the importance of a CT urogram was emphasized  The [facility] expressed fully understanding of the information provided, the consequences and the management.    penile curvature mild curvature was noted to the left  blood flow appeared to be normal although rigidity was not sufficient to maximize erection  patient was anxious regarding renal findings and was not reinjected  He had been advised prior to DUS to defer in light of renal findings but chose to proceed.  FULL detumescence was documented prior to leaving office    He will return for followup after CT and proceed with DUS i      4.14.2021  returns  had CT in 11.2020  2.7 cm bosniak 2 cyst  no solid mass  scheduled for repeat CT 5/6    continues to complain of penile curvature  attempted DUS  did not respond to injection  patient demonstrated photos  penile curvature is mild  options reviewed  discussed issues of lubrication since curvature is mild  RESTOREX reviewed  Will attempt    LUTS improved on medication; continue current regimen    PSA to be checked  patient wants to proceed    5.6.2021  PSA reviewed    Renal USG today demonstrates column of Mohinder and complex cyst  Size of cyst not changed  Reviewed prior CT and inner septations which enhance  Discussed significance  Discussed proceeding with repeat CT in 3 months  Patient wants to proceed "i don't want to take a chance"    Peyronies disease discussed. As discussed at last visit, issues related to partner lubrication and dyspareunia rather than degree of curvature. Partner has utilized lubricant and couple has been able to engage in satisfactory  sexual intercourse. Pleased      5.14.2022  returns for ultrasound and management of erectile dysfunction  curvature improved  not problematic  variable erections ; at times good function at times decreased rigidify  wife "feels it could be better"    Patient is currently experiencing see HPI and nocturia, but no urinary retention, no urinary urgency, no urinary frequency, no straining and no weak stream.      5.9.2023  Mr. Stoddard returns on alfuzosin and Viagra. He rarely needs the Viagra. He states his erections are excellent. The curvature is inconsequential. His sexual activity is satisfactory to he and his partner.    He is urinary symptoms are markedly improved on alfuzosin. His IPSS score is 1. His flow is 28.5 cc/s with a postvoid residual of 24 cc. This is excellent.    We discussed the need for a follow-up CT scan in light of the complex renal cyst which we are following. This is a 6-month follow-up CT.    Plan:  PSA today.  2. Renewal alfuzosin  3. Renewal Viagra  4. CT follow-up re: complex renal cyst  5. Follow-up in 6 months.        12.27.2023 Patient presents today complaining of a "displaced testicle.  Careful questioning patient endorses the fact that he is aware of increasing testicular swelling on the left side.  He got mild discomfort.  On examination he is epididymitis of the tail of the left testicle with tenderness.  There is no erythema.  He denies any change in his urinary symptoms.  He continues on Uroxatrol.  Penile curvature stable Continues on sildenafil for erecitle function  Plan: 1.  Urinalysis 2 urine culture 3 nonsteroidal anti-inflammatories 4.  Ice 5.  Trimethoprim/sulfamethoxazole 1 tablet twice a day for 2 weeks.  He has no known allergies.  Discussed allergic reaction/stop medications immediately etc 6 . 4-6 weeks ultrasound of testicle  2.1.2024 Patient returns after treatment with trimethoprim/sulfamethoxazole for presumed epididymitis.   He states that he is now asymptomatic.  Ultrasound today demonstrates mild thickening of the left epididymis consistent with previous exam.  There is no significant findings.  There is a scrotal june.  There are no testicular masses.  we reviewed prior ultrasound and CT Complex renal cyst   Plan: Follow-up 6 months with 1. continue Uroxatrol 2. repeat PSA in 4 months 3. renal ultrasound in 4 months The [facility]  expressed fully understanding of the information provided, the consequences and the management.  PSA in 6 months

## 2024-02-01 NOTE — HISTORY OF PRESENT ILLNESS
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 penile curvature 2. decreased strength of urinary stream  Patient notes curvature to the left which has been stable x 2 years Does no cause erectile dysfunction Causes wife discomfort wife utilizes lubrication   Urinary symptoms mild nocturia and decreased strength of urination  10.28.2020 patient returns for: 1. renal ultrasound re stone history 2. DUS re penile curvature  4.14.2021 returns on alfuzosin IPSS 7 (markedly improved and pleased) BILL 22 returns re  1. LUTS 2. PEYRONIES 3. COMPLEX CYSt 4. ERECTILE DYSUNCTION  5.6.2021 returns for renal ultrasound and management  10.8.2021 IPSS 4 BILL 20  penile curvature stable renal complex cyst  5.14.2022 returns for ultrasound and management of erectile dysfunction curvature improved not problematic variable erections ; at times good function at times decreased rigidify wife "feels it could be better"  5.9.2023 returns ED much improved utilizing viagra rarely generally does not need partner satisfied IPSS 1 BILL 19  12.27.2023 complaining of "displaced testicle"  2.1.2024 returns after treatement for epididmtis treated witih TMP/SX now asymptomatic

## 2024-02-01 NOTE — PHYSICAL EXAM
[Bowel Sounds] : normal bowel sounds [Abdomen Soft] : soft [Abdomen Tenderness] : non-tender [] : no hepato-splenomegaly [Abdomen Mass (___ Cm)] : no abdominal mass palpated [Abdomen Hernia] : no hernia was discovered [Costovertebral Angle Tenderness] : no ~M costovertebral angle tenderness [Urethral Meatus] : meatus normal [de-identified] : left nodular tail epididymis; no erythema; nontender

## 2024-02-16 ENCOUNTER — RX RENEWAL (OUTPATIENT)
Age: 69
End: 2024-02-16

## 2024-03-27 ENCOUNTER — TRANSCRIPTION ENCOUNTER (OUTPATIENT)
Age: 69
End: 2024-03-27

## 2024-05-09 ENCOUNTER — APPOINTMENT (OUTPATIENT)
Dept: UROLOGY | Facility: CLINIC | Age: 69
End: 2024-05-09
Payer: MEDICARE

## 2024-05-09 DIAGNOSIS — R39.9 UNSPECIFIED SYMPTOMS AND SIGNS INVOLVING THE GENITOURINARY SYSTEM: ICD-10-CM

## 2024-05-09 DIAGNOSIS — R93.429 ABNORMAL RADIOLOGIC FINDINGS ON DIAGNOSITIC IMAGING OF UNSPECIFIED KIDNEY: ICD-10-CM

## 2024-05-09 DIAGNOSIS — Z12.5 ENCOUNTER FOR SCREENING FOR MALIGNANT NEOPLASM OF PROSTATE: ICD-10-CM

## 2024-05-09 DIAGNOSIS — N45.1 EPIDIDYMITIS: ICD-10-CM

## 2024-05-09 PROCEDURE — 99214 OFFICE O/P EST MOD 30 MIN: CPT

## 2024-05-09 PROCEDURE — 76775 US EXAM ABDO BACK WALL LIM: CPT

## 2024-05-09 NOTE — HISTORY OF PRESENT ILLNESS
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 penile curvature 2. decreased strength of urinary stream  Patient notes curvature to the left which has been stable x 2 years Does no cause erectile dysfunction Causes wife discomfort wife utilizes lubrication   Urinary symptoms mild nocturia and decreased strength of urination  10.28.2020 patient returns for: 1. renal ultrasound re stone history 2. DUS re penile curvature  4.14.2021 returns on alfuzosin IPSS 7 (markedly improved and pleased) BILL 22 returns re  1. LUTS 2. PEYRONIES 3. COMPLEX CYSt 4. ERECTILE DYSUNCTION  5.6.2021 returns for renal ultrasound and management  10.8.2021 IPSS 4 BILL 20  penile curvature stable renal complex cyst  5.14.2022 returns for ultrasound and management of erectile dysfunction curvature improved not problematic variable erections ; at times good function at times decreased rigidify wife "feels it could be better"  5.9.2023 returns ED much improved utilizing viagra rarely generally does not need partner satisfied IPSS 1 BILL 19  12.27.2023 complaining of "displaced testicle"  2.1.2024 returns after treatment for epididymitis treated witih TMP/SX now asymptomatic  5.9.2024 no further testicular pain rarely using sildenafil; erections satisfactory [Urinary Incontinence] : no urinary incontinence [Urinary Retention] : no urinary retention [Urinary Urgency] : no urinary urgency [Nocturia] : nocturia [Straining] : no straining [Weak Stream] : no weak stream

## 2024-05-09 NOTE — PHYSICAL EXAM
[Bowel Sounds] : normal bowel sounds [Abdomen Soft] : soft [Abdomen Tenderness] : non-tender [] : no hepato-splenomegaly [Abdomen Mass (___ Cm)] : no abdominal mass palpated [Abdomen Hernia] : no hernia was discovered [Costovertebral Angle Tenderness] : no ~M costovertebral angle tenderness [Urethral Meatus] : meatus normal [Prostate Enlargement] : the prostate was not enlarged [Prostate Tenderness] : the prostate was not tender [de-identified] : nontender pididymis

## 2024-05-09 NOTE — ASSESSMENT
[FreeTextEntry1] : Mr. Rosenbaum is a 65-year-old gentleman. 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 This appears to be stable. It causes him no erectile dysfunction. However, he notes his wife does complain of pain with sexual intercourse. She does have difficulty with lubrication however with artificial lubrication she continues to have discomfort. She states that "it is sitting in a different spot" this causes her discomfort. They have attempted different positions without the resolution of this complaint. On examination his penis is 11 cm in length. There is mild induration on the left side consistent with possible Peyronie's disease. He has no history of trauma. He takes no medications associated with Peyronie's disease. He has no evidence of Dupuytren's contractures in his palms.  Discussed Peyronies disease; pathology; natural history and treatment options.  Patient has symptomatic new lesion without curvature.  Discussed oral medication and lack of proven efficacy.  Discussed ILV (intralesional verpamil)  Discussed xiaflex  Discussed proceeding with DUS    Patient also complains of mild urinary symptoms including nocturia 1-2 times per night and decreased urinary stream. We discussed the utilization of alpha blockers he is interested in medical management.  Pt understands that some of the most common side effect of this medication include dizziness, dry mouth, fatigue, lightheadedness, palpitations. Pt informed to stop the medication should any of these occur. Discussed "retrograde ejaculation" failure of emission from medication.  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        10.28.2020  renal ultrasound demonstrates vascular / mass interpolar in the left kidney with a complex cyst kidney  these findings were reviewed and demonstrated to the patient  the importance of a CT urogram was emphasized  The [facility] expressed fully understanding of the information provided, the consequences and the management.    penile curvature mild curvature was noted to the left  blood flow appeared to be normal although rigidity was not sufficient to maximize erection  patient was anxious regarding renal findings and was not reinjected  He had been advised prior to DUS to defer in light of renal findings but chose to proceed.  FULL detumescence was documented prior to leaving office    He will return for followup after CT and proceed with DUS i      4.14.2021  returns  had CT in 11.2020  2.7 cm bosniak 2 cyst  no solid mass  scheduled for repeat CT 5/6    continues to complain of penile curvature  attempted DUS  did not respond to injection  patient demonstrated photos  penile curvature is mild  options reviewed  discussed issues of lubrication since curvature is mild  RESTOREX reviewed  Will attempt    LUTS improved on medication; continue current regimen    PSA to be checked  patient wants to proceed    5.6.2021  PSA reviewed    Renal USG today demonstrates column of Mohinder and complex cyst  Size of cyst not changed  Reviewed prior CT and inner septations which enhance  Discussed significance  Discussed proceeding with repeat CT in 3 months  Patient wants to proceed "i don't want to take a chance"    Peyronies disease discussed. As discussed at last visit, issues related to partner lubrication and dyspareunia rather than degree of curvature. Partner has utilized lubricant and couple has been able to engage in satisfactory  sexual intercourse. Pleased      5.14.2022  returns for ultrasound and management of erectile dysfunction  curvature improved  not problematic  variable erections ; at times good function at times decreased rigidify  wife "feels it could be better"    Patient is currently experiencing see HPI and nocturia, but no urinary retention, no urinary urgency, no urinary frequency, no straining and no weak stream.      5.9.2023  Mr. Stoddard returns on alfuzosin and Viagra. He rarely needs the Viagra. He states his erections are excellent. The curvature is inconsequential. His sexual activity is satisfactory to he and his partner.    He is urinary symptoms are markedly improved on alfuzosin. His IPSS score is 1. His flow is 28.5 cc/s with a postvoid residual of 24 cc. This is excellent.    We discussed the need for a follow-up CT scan in light of the complex renal cyst which we are following. This is a 6-month follow-up CT.    Plan:  PSA today.  2. Renewal alfuzosin  3. Renewal Viagra  4. CT follow-up re: complex renal cyst  5. Follow-up in 6 months.        12.27.2023 Patient presents today complaining of a "displaced testicle.  Careful questioning patient endorses the fact that he is aware of increasing testicular swelling on the left side.  He got mild discomfort.  On examination he is epididymitis of the tail of the left testicle with tenderness.  There is no erythema.  He denies any change in his urinary symptoms.  He continues on Uroxatrol.  Penile curvature stable Continues on sildenafil for erecitle function  Plan: 1.  Urinalysis 2 urine culture 3 nonsteroidal anti-inflammatories 4.  Ice 5.  Trimethoprim/sulfamethoxazole 1 tablet twice a day for 2 weeks.  He has no known allergies.  Discussed allergic reaction/stop medications immediately etc 6 . 4-6 weeks ultrasound of testicle  2.1.2024 Patient returns after treatment with trimethoprim/sulfamethoxazole for presumed epididymitis.   He states that he is now asymptomatic.  Ultrasound today demonstrates mild thickening of the left epididymis consistent with previous exam.  There is no significant findings.  There is a scrotal june.  There are no testicular masses.  we reviewed prior ultrasound and CT Complex renal cyst   Plan: Follow-up 6 months with 1. continue Uroxatrol 2. repeat PSA in 4 months 3. renal ultrasound in 4 months The [facility]  expressed fully understanding of the information provided, the consequences and the management.  PSA in 6 months  5/9/2024  Patient returns he has no further symptoms of epididymitis.  His urinary symptoms are stable.  He has nocturia 0-1 time per night.  He is not bothered by this. on alfuzosin  His sexual function seems to be proved.  He takes sildenafil rarely.  Most times he does not require this.  There is no change in penile curvature is not bothersome.  We discussed his renal ultrasound.  This appears to be a complex cyst.  Measurements are similar to the CT scan of 1 year ago.  In light of this we will continue to follow this in 6 months. Images demonstrated  Plan: 1.  PSA 2.  Urinalysis 3.  Renal ultrasound in 6 months The [facility]  expressed fully understanding of the information provided, the consequences and the management.

## 2024-05-10 ENCOUNTER — TRANSCRIPTION ENCOUNTER (OUTPATIENT)
Age: 69
End: 2024-05-10

## 2024-05-10 LAB
APPEARANCE: CLEAR
BACTERIA: NEGATIVE /HPF
BILIRUBIN URINE: NEGATIVE
BLOOD URINE: NEGATIVE
CAST: 1 /LPF
COLOR: YELLOW
EPITHELIAL CELLS: 0 /HPF
GLUCOSE QUALITATIVE U: NEGATIVE MG/DL
KETONES URINE: NEGATIVE MG/DL
LEUKOCYTE ESTERASE URINE: NEGATIVE
MICROSCOPIC-UA: NORMAL
NITRITE URINE: NEGATIVE
PH URINE: 6
PROTEIN URINE: NEGATIVE MG/DL
PSA SERPL-MCNC: 0.79 NG/ML
RED BLOOD CELLS URINE: 3 /HPF
SPECIFIC GRAVITY URINE: 1.02
UROBILINOGEN URINE: 1 MG/DL
WHITE BLOOD CELLS URINE: 0 /HPF

## 2024-05-12 ENCOUNTER — RX RENEWAL (OUTPATIENT)
Age: 69
End: 2024-05-12

## 2024-05-16 ENCOUNTER — TRANSCRIPTION ENCOUNTER (OUTPATIENT)
Age: 69
End: 2024-05-16

## 2024-05-16 RX ORDER — ALFUZOSIN HYDROCHLORIDE 10 MG/1
10 TABLET, EXTENDED RELEASE ORAL
Qty: 90 | Refills: 0 | Status: ACTIVE | COMMUNITY
Start: 2020-10-14 | End: 1900-01-01

## 2024-06-04 ENCOUNTER — APPOINTMENT (OUTPATIENT)
Dept: CARDIOLOGY | Facility: CLINIC | Age: 69
End: 2024-06-04
Payer: MEDICARE

## 2024-06-04 ENCOUNTER — NON-APPOINTMENT (OUTPATIENT)
Age: 69
End: 2024-06-04

## 2024-06-04 VITALS
HEART RATE: 62 BPM | HEIGHT: 71 IN | RESPIRATION RATE: 17 BRPM | OXYGEN SATURATION: 97 % | DIASTOLIC BLOOD PRESSURE: 81 MMHG | WEIGHT: 205 LBS | BODY MASS INDEX: 28.7 KG/M2 | SYSTOLIC BLOOD PRESSURE: 137 MMHG

## 2024-06-04 DIAGNOSIS — R42 DIZZINESS AND GIDDINESS: ICD-10-CM

## 2024-06-04 DIAGNOSIS — R94.31 ABNORMAL ELECTROCARDIOGRAM [ECG] [EKG]: ICD-10-CM

## 2024-06-04 PROCEDURE — 93000 ELECTROCARDIOGRAM COMPLETE: CPT

## 2024-06-04 PROCEDURE — G2211 COMPLEX E/M VISIT ADD ON: CPT

## 2024-06-04 PROCEDURE — 99214 OFFICE O/P EST MOD 30 MIN: CPT

## 2024-06-08 NOTE — HISTORY OF PRESENT ILLNESS
[FreeTextEntry1] : Patient is a 68 year-old white gentleman with known past medical history of dyslipidemia, recently presented to see his PMD, Boo Hung MD for his annual physical exam, and he was noted to have new T-wave inversions in the lateral leads. The voltage continues to be consistent with LVH. Plays basketball three times per week, but recently hurt his knee. Sees José Luis Nguyễn MD for orthopedics.  He never has symptoms of chest pain or shortness of breath. He is concerned because of a family history of coronary artery disease in his father and paternal grandfather.   Patient has not been sick with Covid-19. He has received two doses of Covid-19 vaccine and two boosters (all Moderna).  10/10/2022. Johnathan Johns returns today to review the results of his recent cardiac catheterization. He was initially referred to this office for an abnormal ECG and underwent subsequent stress testing. Nuclear imaging revealed medium sized mild defects in the inferior and inferolateral walls that were partially reversible and consistent with infarction with moderate krishna-infarct ischemia. A left heart cath performed on 9/28/2022 revealed only mild luminal irregularities. Today he is feeling well. He continues to play basketball several times a week and is not limited by any chest discomfort or shortness of breath.  We have reviewed his medications and he is taking all as directed, including nightly atorvastatin.

## 2024-06-08 NOTE — DISCUSSION/SUMMARY
[EKG obtained to assist in diagnosis and management of assessed problem(s)] : EKG obtained to assist in diagnosis and management of assessed problem(s) [FreeTextEntry1] : Patient is a 68 year-old gentleman with known family history of coronary artery disease presents for follow up because of episodes of exertional and orthostatic dizziness.  We reviewed his recent ECG, nuclear stress test and angiogram results and he has been reassured that from a cardiovascular standpoint he is doing very well. His blood pressure remains within normal limits. He will continue nightly atorvastatin for cardiovascular risk reduction. Encouraged continued routine aerobic exercise.  Stress echo to evaluate for exercise induced gradient that could explain his symptoms.

## 2024-06-08 NOTE — REASON FOR VISIT
[Arrhythmia/ECG Abnorrmalities] : arrhythmia/ECG abnormalities [FreeTextEntry3] : Boo Hung MD [FreeTextEntry1] : June 2024 - Patient returns today for follow-up more than a year after his last visit. Patient has experienced two episodes of dizziness. One occurred while standing up quickly. One occurred while playing basketball. He continues to play basketball three times per week, two half-court games and one full court game. He says the symptom was transient and resolved with rest and hydration.

## 2024-06-08 NOTE — CARDIOLOGY SUMMARY
[de-identified] : 8/29/2022, sinus 64 bpm, LVH with strain pattern - the strain pattern was not previously noted on 2021 ECG [de-identified] : 9/15/2022, 10 minutes of Vikram protocol (11 METS), 95% MPHR, inferior and inferolateral infarct with krishna-infarct ischemia, LVEF 59%, LVEDV 11 mL [de-identified] : 8/30/2022, mildly dilated LA, normal pulmonary pressures, normal LV systolic function, 60-65% [de-identified] : 9/28/2022 with David Vargas MD at [facility] - minor luminal irregularities

## 2024-07-01 ENCOUNTER — NON-APPOINTMENT (OUTPATIENT)
Age: 69
End: 2024-07-01

## 2024-07-03 ENCOUNTER — APPOINTMENT (OUTPATIENT)
Dept: CARDIOLOGY | Facility: CLINIC | Age: 69
End: 2024-07-03

## 2024-08-09 ENCOUNTER — APPOINTMENT (OUTPATIENT)
Dept: INTERNAL MEDICINE | Facility: CLINIC | Age: 69
End: 2024-08-09

## 2024-08-09 PROCEDURE — G0439: CPT

## 2024-08-09 PROCEDURE — 36415 COLL VENOUS BLD VENIPUNCTURE: CPT

## 2024-08-09 NOTE — HEALTH RISK ASSESSMENT
[Patient reported colonoscopy was normal] : Patient reported colonoscopy was normal [No] : In the past 12 months have you used drugs other than those required for medical reasons? No [No falls in past year] : Patient reported no falls in the past year [0] : 2) Feeling down, depressed, or hopeless: Not at all (0) [PHQ-2 Negative - No further assessment needed] : PHQ-2 Negative - No further assessment needed [HIV test declined] : HIV test declined [None] : None [With Family] : lives with family [] :  [Sexually Active] : sexually active [Feels Safe at Home] : Feels safe at home [Fully functional (bathing, dressing, toileting, transferring, walking, feeding)] : Fully functional (bathing, dressing, toileting, transferring, walking, feeding) [Fully functional (using the telephone, shopping, preparing meals, housekeeping, doing laundry, using] : Fully functional and needs no help or supervision to perform IADLs (using the telephone, shopping, preparing meals, housekeeping, doing laundry, using transportation, managing medications and managing finances) [Smoke Detector] : smoke detector [Carbon Monoxide Detector] : carbon monoxide detector [Seat Belt] :  uses seat belt [Sunscreen] : uses sunscreen [[id number]] : 0 [Change in mental status noted] : No change in mental status noted [Reports changes in hearing] : Reports no changes in hearing [ColonoscopyDate] : 09/20 [ColonoscopyComments] : Dr. Salomón Wade - 5 year follow-up. [HepatitisCDate] : 04/16 [HepatitisCComments] : Negative [de-identified] : +SNHL [de-identified] : Invisalign [FreeTextEntry4] : Will discuss Advance Directives in a future visit.

## 2024-08-09 NOTE — HISTORY OF PRESENT ILLNESS
[de-identified] : New Patient - 1st visit to [facility] at [place].  Patient is a 69-year-old male with a history of hyperlipidemia, abnormal ECG, ED, pneumonia, Peyronie's disease, complex kidney cyst.  He is seeing Dr. Johnathan Euceda ().  He had mild luminal irregularities on a cardiac catheterization 9/28/2022.  He was to have repeat testing July 2024, but this was postponed as patient got COVID-19.  He sees Dr. Mckeon () for lower urinary tract symptoms, ED, and Peyronie's disease (which resolved).  Patient received the influenza vaccination Sept 2023.  He received the [id number] Moderna COVID-19 vaccine booster.

## 2024-10-31 ENCOUNTER — EMERGENCY (EMERGENCY)
Facility: HOSPITAL | Age: 69
LOS: 1 days | Discharge: HOME | End: 2024-10-31
Attending: EMERGENCY MEDICINE | Admitting: EMERGENCY MEDICINE
Payer: COMMERCIAL

## 2024-10-31 VITALS
HEART RATE: 67 BPM | WEIGHT: 199.96 LBS | HEIGHT: 71 IN | RESPIRATION RATE: 15 BRPM | DIASTOLIC BLOOD PRESSURE: 74 MMHG | TEMPERATURE: 98 F | SYSTOLIC BLOOD PRESSURE: 123 MMHG | OXYGEN SATURATION: 99 %

## 2024-10-31 VITALS
OXYGEN SATURATION: 98 % | HEART RATE: 66 BPM | TEMPERATURE: 98 F | RESPIRATION RATE: 18 BRPM | DIASTOLIC BLOOD PRESSURE: 77 MMHG | SYSTOLIC BLOOD PRESSURE: 145 MMHG

## 2024-10-31 DIAGNOSIS — Z98.52 VASECTOMY STATUS: Chronic | ICD-10-CM

## 2024-10-31 PROCEDURE — 99284 EMERGENCY DEPT VISIT MOD MDM: CPT

## 2024-10-31 PROCEDURE — 73562 X-RAY EXAM OF KNEE 3: CPT | Mod: 26,LT

## 2024-10-31 PROCEDURE — 71046 X-RAY EXAM CHEST 2 VIEWS: CPT | Mod: 26

## 2024-10-31 PROCEDURE — 99284 EMERGENCY DEPT VISIT MOD MDM: CPT | Mod: 25

## 2024-10-31 PROCEDURE — 71046 X-RAY EXAM CHEST 2 VIEWS: CPT

## 2024-10-31 PROCEDURE — 73562 X-RAY EXAM OF KNEE 3: CPT

## 2024-10-31 RX ORDER — ACETAMINOPHEN 500 MG/5ML
650 LIQUID (ML) ORAL ONCE
Refills: 0 | Status: COMPLETED | OUTPATIENT
Start: 2024-10-31 | End: 2024-10-31

## 2024-10-31 RX ADMIN — Medication 650 MILLIGRAM(S): at 10:21

## 2024-10-31 RX ADMIN — Medication 650 MILLIGRAM(S): at 10:03

## 2024-11-04 ENCOUNTER — RX RENEWAL (OUTPATIENT)
Age: 69
End: 2024-11-04

## 2024-11-07 ENCOUNTER — APPOINTMENT (OUTPATIENT)
Dept: UROLOGY | Facility: CLINIC | Age: 69
End: 2024-11-07
Payer: MEDICARE

## 2024-11-07 DIAGNOSIS — N45.1 EPIDIDYMITIS: ICD-10-CM

## 2024-11-07 DIAGNOSIS — R93.429 ABNORMAL RADIOLOGIC FINDINGS ON DIAGNOSITIC IMAGING OF UNSPECIFIED KIDNEY: ICD-10-CM

## 2024-11-07 DIAGNOSIS — Z12.5 ENCOUNTER FOR SCREENING FOR MALIGNANT NEOPLASM OF PROSTATE: ICD-10-CM

## 2024-11-07 DIAGNOSIS — R82.81 PYURIA: ICD-10-CM

## 2024-11-07 DIAGNOSIS — Q55.61 CURVATURE OF PENIS (LATERAL): ICD-10-CM

## 2024-11-07 DIAGNOSIS — Z87.438 PERSONAL HISTORY OF OTHER DISEASES OF MALE GENITAL ORGANS: ICD-10-CM

## 2024-11-07 PROCEDURE — G2211 COMPLEX E/M VISIT ADD ON: CPT

## 2024-11-07 PROCEDURE — 76705 ECHO EXAM OF ABDOMEN: CPT

## 2024-11-07 PROCEDURE — 99214 OFFICE O/P EST MOD 30 MIN: CPT

## 2024-11-13 ENCOUNTER — OUTPATIENT (OUTPATIENT)
Dept: OUTPATIENT SERVICES | Facility: HOSPITAL | Age: 69
LOS: 1 days | End: 2024-11-13
Payer: COMMERCIAL

## 2024-11-13 ENCOUNTER — APPOINTMENT (OUTPATIENT)
Dept: MRI IMAGING | Facility: CLINIC | Age: 69
End: 2024-11-13
Payer: COMMERCIAL

## 2024-11-13 DIAGNOSIS — Z98.52 VASECTOMY STATUS: Chronic | ICD-10-CM

## 2024-11-13 DIAGNOSIS — S83.222A PERIPHERAL TEAR OF MEDIAL MENISCUS, CURRENT INJURY, LEFT KNEE, INITIAL ENCOUNTER: ICD-10-CM

## 2024-11-13 PROCEDURE — 73721 MRI JNT OF LWR EXTRE W/O DYE: CPT

## 2024-11-13 PROCEDURE — 73721 MRI JNT OF LWR EXTRE W/O DYE: CPT | Mod: 26,LT

## 2024-12-10 NOTE — H&P CARDIOLOGY - NS MD HP PULSE DORSALIS
right normal/left normal to display      CONSULTS: (Who and What was discussed)  None  Discussion with Other Profesionals : None    Social Determinants : None    Records Reviewed : None    CC/HPI Summary, DDx, ED Course, and Reassessment: Jeronimo Atkins is a 89 y.o. female who presents to the ED for a skin tear to the right hand.  Patient is right-hand dominant.  Patient states that her granddaughter's dog jumped up and scratched her with his nail.  The area was cleansed at home prior to arrival.  Patient's tetanus is up-to-date.  Patient is on Coumadin.  Bleeding controlled at the time of arrival.  Patient denies any numbness, tingling, weakness.  Patient denies any other injury.  Patient denies any chest pain, shortness of breath, headache, lightheadedness, dizziness, fever, chills, body aches.  Patient denies any other symptoms.  Patient has no other complaints at this time.  Differential diagnosis includes but is not limited to laceration with foreign body, laceration with tendon involvement, need for tetanus.  Skin tear cleansed and repaired as noted in procedure note above.  Patient declined pain medication.  Discussed diagnosis and plan to follow-up with PCP along with the use of over-the-counter Tylenol as needed.  Patient advised to return to the ED with any new or worsening symptoms.  Patient and patient family member verbalized understanding and is in agreement with plan.         Disposition Considerations (include 1 Tests not done, Shared Decision Making, Pt Expectation of Test or Tx.): Based on HPI and PE, no testing is indicated at this time.  Diagnosis of skin tear of right hand without complication is consistent exam findings.  Appropriate for outpatient management follow-up with PCP    The emergency provider has spoken with patient and discussed today's results, in addition to providing specific details for the plan of care and counseling regarding the diagnosis and prognosis.  Patient instructed to follow-up with PCP.

## 2025-01-20 ENCOUNTER — NON-APPOINTMENT (OUTPATIENT)
Age: 70
End: 2025-01-20

## 2025-01-20 ENCOUNTER — APPOINTMENT (OUTPATIENT)
Dept: CARDIOLOGY | Facility: CLINIC | Age: 70
End: 2025-01-20
Payer: COMMERCIAL

## 2025-01-20 VITALS
HEART RATE: 72 BPM | SYSTOLIC BLOOD PRESSURE: 115 MMHG | BODY MASS INDEX: 29.4 KG/M2 | OXYGEN SATURATION: 97 % | DIASTOLIC BLOOD PRESSURE: 70 MMHG | HEIGHT: 71 IN | WEIGHT: 210 LBS

## 2025-01-20 DIAGNOSIS — R00.2 PALPITATIONS: ICD-10-CM

## 2025-01-20 DIAGNOSIS — E78.5 HYPERLIPIDEMIA, UNSPECIFIED: ICD-10-CM

## 2025-01-20 PROCEDURE — 93000 ELECTROCARDIOGRAM COMPLETE: CPT

## 2025-01-20 PROCEDURE — G2211 COMPLEX E/M VISIT ADD ON: CPT | Mod: NC

## 2025-01-20 PROCEDURE — 99204 OFFICE O/P NEW MOD 45 MIN: CPT

## 2025-01-20 RX ORDER — ASPIRIN ENTERIC COATED TABLETS 81 MG 81 MG/1
81 TABLET, DELAYED RELEASE ORAL
Qty: 90 | Refills: 2 | Status: ACTIVE | COMMUNITY
Start: 2025-01-20

## 2025-01-21 LAB
ALBUMIN SERPL ELPH-MCNC: 4.7 G/DL
ALP BLD-CCNC: 58 U/L
ALT SERPL-CCNC: 24 U/L
ANION GAP SERPL CALC-SCNC: 12 MMOL/L
AST SERPL-CCNC: 18 U/L
BASOPHILS # BLD AUTO: 0.04 K/UL
BASOPHILS NFR BLD AUTO: 0.6 %
BILIRUB SERPL-MCNC: 0.7 MG/DL
BUN SERPL-MCNC: 22 MG/DL
CALCIUM SERPL-MCNC: 9.3 MG/DL
CHLORIDE SERPL-SCNC: 105 MMOL/L
CHOLEST SERPL-MCNC: 169 MG/DL
CO2 SERPL-SCNC: 24 MMOL/L
CREAT SERPL-MCNC: 1.32 MG/DL
EGFR: 59 ML/MIN/1.73M2
EOSINOPHIL # BLD AUTO: 0.1 K/UL
EOSINOPHIL NFR BLD AUTO: 1.4 %
ESTIMATED AVERAGE GLUCOSE: 108 MG/DL
GLUCOSE SERPL-MCNC: 114 MG/DL
HBA1C MFR BLD HPLC: 5.4 %
HCT VFR BLD CALC: 40.1 %
HDLC SERPL-MCNC: 34 MG/DL
HGB BLD-MCNC: 14.1 G/DL
IMM GRANULOCYTES NFR BLD AUTO: 0.3 %
LDLC SERPL CALC-MCNC: 63 MG/DL
LYMPHOCYTES # BLD AUTO: 2.05 K/UL
LYMPHOCYTES NFR BLD AUTO: 28.4 %
MAN DIFF?: NORMAL
MCHC RBC-ENTMCNC: 31.9 PG
MCHC RBC-ENTMCNC: 35.2 G/DL
MCV RBC AUTO: 90.7 FL
MONOCYTES # BLD AUTO: 0.64 K/UL
MONOCYTES NFR BLD AUTO: 8.9 %
NEUTROPHILS # BLD AUTO: 4.36 K/UL
NEUTROPHILS NFR BLD AUTO: 60.4 %
NONHDLC SERPL-MCNC: 135 MG/DL
PLATELET # BLD AUTO: 182 K/UL
POTASSIUM SERPL-SCNC: 4 MMOL/L
PROT SERPL-MCNC: 7.1 G/DL
RBC # BLD: 4.42 M/UL
RBC # FLD: 12.2 %
SODIUM SERPL-SCNC: 142 MMOL/L
T4 SERPL-MCNC: 6.4 UG/DL
TRIGL SERPL-MCNC: 470 MG/DL
TSH SERPL-ACNC: 1.82 UIU/ML
WBC # FLD AUTO: 7.21 K/UL

## 2025-01-29 ENCOUNTER — APPOINTMENT (OUTPATIENT)
Dept: CARDIOLOGY | Facility: CLINIC | Age: 70
End: 2025-01-29
Payer: COMMERCIAL

## 2025-01-29 PROCEDURE — 93306 TTE W/DOPPLER COMPLETE: CPT

## 2025-01-31 ENCOUNTER — RX RENEWAL (OUTPATIENT)
Age: 70
End: 2025-01-31

## 2025-02-06 DIAGNOSIS — I48.0 PAROXYSMAL ATRIAL FIBRILLATION: ICD-10-CM

## 2025-02-06 RX ORDER — APIXABAN 5 MG/1
5 TABLET, FILM COATED ORAL
Qty: 180 | Refills: 3 | Status: ACTIVE | COMMUNITY
Start: 2025-02-06 | End: 1900-01-01

## 2025-02-11 ENCOUNTER — NON-APPOINTMENT (OUTPATIENT)
Age: 70
End: 2025-02-11

## 2025-02-11 ENCOUNTER — APPOINTMENT (OUTPATIENT)
Dept: ELECTROPHYSIOLOGY | Facility: CLINIC | Age: 70
End: 2025-02-11
Payer: COMMERCIAL

## 2025-02-11 VITALS
DIASTOLIC BLOOD PRESSURE: 79 MMHG | WEIGHT: 200 LBS | SYSTOLIC BLOOD PRESSURE: 117 MMHG | OXYGEN SATURATION: 97 % | HEIGHT: 71 IN | BODY MASS INDEX: 28 KG/M2 | HEART RATE: 81 BPM

## 2025-02-11 PROCEDURE — 99204 OFFICE O/P NEW MOD 45 MIN: CPT | Mod: 25

## 2025-02-11 PROCEDURE — 93000 ELECTROCARDIOGRAM COMPLETE: CPT

## 2025-04-04 ENCOUNTER — APPOINTMENT (OUTPATIENT)
Dept: ELECTROPHYSIOLOGY | Facility: CLINIC | Age: 70
End: 2025-04-04

## 2025-04-10 ENCOUNTER — APPOINTMENT (OUTPATIENT)
Dept: ELECTROPHYSIOLOGY | Facility: CLINIC | Age: 70
End: 2025-04-10

## 2025-04-11 ENCOUNTER — OUTPATIENT (OUTPATIENT)
Dept: OUTPATIENT SERVICES | Facility: HOSPITAL | Age: 70
LOS: 1 days | End: 2025-04-11

## 2025-04-11 VITALS
WEIGHT: 214.95 LBS | DIASTOLIC BLOOD PRESSURE: 74 MMHG | RESPIRATION RATE: 18 BRPM | TEMPERATURE: 98 F | HEIGHT: 71 IN | HEART RATE: 71 BPM | OXYGEN SATURATION: 97 % | SYSTOLIC BLOOD PRESSURE: 124 MMHG

## 2025-04-11 DIAGNOSIS — I48.0 PAROXYSMAL ATRIAL FIBRILLATION: ICD-10-CM

## 2025-04-11 DIAGNOSIS — Z98.52 VASECTOMY STATUS: Chronic | ICD-10-CM

## 2025-04-11 DIAGNOSIS — Z98.890 OTHER SPECIFIED POSTPROCEDURAL STATES: Chronic | ICD-10-CM

## 2025-04-11 LAB
ANION GAP SERPL CALC-SCNC: 13 MMOL/L — SIGNIFICANT CHANGE UP (ref 7–14)
BASOPHILS # BLD AUTO: 0.04 K/UL — SIGNIFICANT CHANGE UP (ref 0–0.2)
BASOPHILS NFR BLD AUTO: 0.6 % — SIGNIFICANT CHANGE UP (ref 0–2)
BLD GP AB SCN SERPL QL: NEGATIVE — SIGNIFICANT CHANGE UP
BUN SERPL-MCNC: 18 MG/DL — SIGNIFICANT CHANGE UP (ref 7–23)
CALCIUM SERPL-MCNC: 9.1 MG/DL — SIGNIFICANT CHANGE UP (ref 8.4–10.5)
CHLORIDE SERPL-SCNC: 104 MMOL/L — SIGNIFICANT CHANGE UP (ref 98–107)
CO2 SERPL-SCNC: 24 MMOL/L — SIGNIFICANT CHANGE UP (ref 22–31)
CREAT SERPL-MCNC: 1.04 MG/DL — SIGNIFICANT CHANGE UP (ref 0.5–1.3)
EGFR: 78 ML/MIN/1.73M2 — SIGNIFICANT CHANGE UP
EGFR: 78 ML/MIN/1.73M2 — SIGNIFICANT CHANGE UP
EOSINOPHIL # BLD AUTO: 0.13 K/UL — SIGNIFICANT CHANGE UP (ref 0–0.5)
EOSINOPHIL NFR BLD AUTO: 1.9 % — SIGNIFICANT CHANGE UP (ref 0–6)
GLUCOSE SERPL-MCNC: 97 MG/DL — SIGNIFICANT CHANGE UP (ref 70–99)
HCT VFR BLD CALC: 40.2 % — SIGNIFICANT CHANGE UP (ref 39–50)
HGB BLD-MCNC: 14.3 G/DL — SIGNIFICANT CHANGE UP (ref 13–17)
IMM GRANULOCYTES NFR BLD AUTO: 0.3 % — SIGNIFICANT CHANGE UP (ref 0–0.9)
LYMPHOCYTES # BLD AUTO: 1.75 K/UL — SIGNIFICANT CHANGE UP (ref 1–3.3)
LYMPHOCYTES # BLD AUTO: 25.4 % — SIGNIFICANT CHANGE UP (ref 13–44)
MCHC RBC-ENTMCNC: 31.7 PG — SIGNIFICANT CHANGE UP (ref 27–34)
MCHC RBC-ENTMCNC: 35.6 G/DL — SIGNIFICANT CHANGE UP (ref 32–36)
MCV RBC AUTO: 89.1 FL — SIGNIFICANT CHANGE UP (ref 80–100)
MONOCYTES # BLD AUTO: 0.62 K/UL — SIGNIFICANT CHANGE UP (ref 0–0.9)
MONOCYTES NFR BLD AUTO: 9 % — SIGNIFICANT CHANGE UP (ref 2–14)
NEUTROPHILS # BLD AUTO: 4.34 K/UL — SIGNIFICANT CHANGE UP (ref 1.8–7.4)
NEUTROPHILS NFR BLD AUTO: 62.8 % — SIGNIFICANT CHANGE UP (ref 43–77)
PLATELET # BLD AUTO: 174 K/UL — SIGNIFICANT CHANGE UP (ref 150–400)
POTASSIUM SERPL-MCNC: 3.8 MMOL/L — SIGNIFICANT CHANGE UP (ref 3.5–5.3)
POTASSIUM SERPL-SCNC: 3.8 MMOL/L — SIGNIFICANT CHANGE UP (ref 3.5–5.3)
RBC # BLD: 4.51 M/UL — SIGNIFICANT CHANGE UP (ref 4.2–5.8)
RBC # FLD: 12.1 % — SIGNIFICANT CHANGE UP (ref 10.3–14.5)
RH IG SCN BLD-IMP: POSITIVE — SIGNIFICANT CHANGE UP
RH IG SCN BLD-IMP: POSITIVE — SIGNIFICANT CHANGE UP
SODIUM SERPL-SCNC: 141 MMOL/L — SIGNIFICANT CHANGE UP (ref 135–145)
WBC # BLD: 6.9 K/UL — SIGNIFICANT CHANGE UP (ref 3.8–10.5)
WBC # FLD AUTO: 6.9 K/UL — SIGNIFICANT CHANGE UP (ref 3.8–10.5)

## 2025-04-11 NOTE — H&P PST ADULT - RESPIRATORY
clear to auscultation bilaterally/no rales/no rhonchi/no respiratory distress/airway patent/breath sounds equal/good air movement/respirations non-labored clear to auscultation bilaterally/no wheezes/no rales/no rhonchi/no respiratory distress/airway patent/breath sounds equal/good air movement/respirations non-labored

## 2025-04-11 NOTE — H&P PST ADULT - NSANTHOSAYNRD_GEN_A_CORE
No. WALLY screening performed.  STOP BANG Legend: 0-2 = LOW Risk; 3-4 = INTERMEDIATE Risk; 5-8 = HIGH Risk
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
## 2025-04-11 NOTE — H&P PST ADULT - NSICDXPASTSURGICALHX_GEN_ALL_CORE_FT
PAST SURGICAL HISTORY:  History of vasectomy     S/P correction of deviated nasal septum     S/P left knee arthroscopy

## 2025-04-11 NOTE — H&P PST ADULT - LAST ECHOCARDIOGRAM
01/2025 01/29/2025: Left ventricular systolic function is normal with an ejection fraction visually estimated at 60 to 65 %.

## 2025-04-11 NOTE — H&P PST ADULT - PROBLEM SELECTOR PLAN 1
Schedule for atrial fibrillation (paroxysmal) ablation tentatively on 04/22/25. Pre op instructions, chlorhexidine given and explained. Pt verbalized understanding. Schedule for atrial fibrillation (paroxysmal) ablation tentatively on 04/22/25. Pre op instructions, chlorhexidine given and explained. Pt verbalized understanding.    Pt instructed to follow surgeon's medications recommendations. Pt verbalized understanding.

## 2025-04-11 NOTE — H&P PST ADULT - LAST STRESS TEST
> 3 years Stress Test: Stress Test: 9/15/2022, 10 minutes of Vikram protocol (11 METS), 95% MPHR, inferior and inferolateral infarct with krishna-infarct ischemia, LVEF 59%, LVEDV 11 mL

## 2025-04-11 NOTE — H&P PST ADULT - HISTORY OF PRESENT ILLNESS
70 y/o M  episodes on 12/2024, felt palpitations Eval by cardio who then referred him to surgeon 70 y/o M with h/o AFIB presents for pre op evaluation reported " episodes on 12/2024 and 01/2025, felt palpitations associated with light headedness. Pt was evaluated  by his cardio who then referred him to surgeon. As per record, "Zio Holter from 1/20-1/26/25 which revealed sinus rhythm with average rate 68 bpm and with 8% AF burden with longest episode for 1 hour 2 minutes with average rate 100 bpm, SVE < 1% and PVCs < 1%". Pre op diagnosis: paroxysmal atrial fibrillation. Schedule for Atrial fibrillation ablation tentatively o 04/22/25 68 y/o M with h/o AFIB presents for pre op evaluation reported " episodes on 12/2024 and 01/2025, felt palpitations associated with light headedness. Pt was evaluated  by his cardio who then referred him to surgeon. As per record, "Zio Holter from 1/20-1/26/25 which revealed sinus rhythm with average rate 68 bpm and with 8% AF burden with longest episode for 1 hour 2 minutes with average rate 100 bpm, SVE < 1% and PVCs < 1%". Pre op diagnosis: paroxysmal atrial fibrillation. Schedule for Atrial fibrillation ablation tentatively on 04/22/25

## 2025-04-11 NOTE — H&P PST ADULT - NEGATIVE MUSCULOSKELETAL SYMPTOMS
no arthritis/no joint swelling/no muscle weakness/no stiffness/no neck pain no arthritis/no joint swelling/no myalgia/no muscle weakness/no stiffness/no neck pain/no leg pain L/no leg pain R

## 2025-04-11 NOTE — H&P PST ADULT - LAST CARDIAC ANGIOGRAM/IMAGING
2023 at [facility] - no stent placement Cardiac Cath/PCI: 9/28/2022 with David Varags MD at [facility] - minor luminal irregularities. - no stent placement

## 2025-04-11 NOTE — H&P PST ADULT - MUSCULOSKELETAL
details… ROM intact/no joint swelling/no joint erythema/no joint warmth/no calf tenderness/normal gait/strength 5/5 bilateral upper extremities ROM intact/no joint swelling/no joint erythema/no joint warmth/no calf tenderness/normal gait/strength 5/5 bilateral upper extremities/no chest wall tenderness
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
## 2025-04-22 ENCOUNTER — OUTPATIENT (OUTPATIENT)
Dept: OUTPATIENT SERVICES | Facility: HOSPITAL | Age: 70
LOS: 1 days | End: 2025-04-22
Payer: COMMERCIAL

## 2025-04-22 ENCOUNTER — TRANSCRIPTION ENCOUNTER (OUTPATIENT)
Age: 70
End: 2025-04-22

## 2025-04-22 VITALS
DIASTOLIC BLOOD PRESSURE: 51 MMHG | RESPIRATION RATE: 18 BRPM | OXYGEN SATURATION: 100 % | HEART RATE: 80 BPM | SYSTOLIC BLOOD PRESSURE: 108 MMHG

## 2025-04-22 VITALS
OXYGEN SATURATION: 97 % | SYSTOLIC BLOOD PRESSURE: 126 MMHG | HEIGHT: 71 IN | RESPIRATION RATE: 15 BRPM | HEART RATE: 63 BPM | WEIGHT: 199.96 LBS | DIASTOLIC BLOOD PRESSURE: 54 MMHG | TEMPERATURE: 98 F

## 2025-04-22 DIAGNOSIS — Z98.890 OTHER SPECIFIED POSTPROCEDURAL STATES: Chronic | ICD-10-CM

## 2025-04-22 DIAGNOSIS — Z98.52 VASECTOMY STATUS: Chronic | ICD-10-CM

## 2025-04-22 DIAGNOSIS — I48.0 PAROXYSMAL ATRIAL FIBRILLATION: ICD-10-CM

## 2025-04-22 PROCEDURE — 93655 ICAR CATH ABLTJ DSCRT ARRHYT: CPT

## 2025-04-22 PROCEDURE — 93010 ELECTROCARDIOGRAM REPORT: CPT | Mod: 76

## 2025-04-22 PROCEDURE — 93623 PRGRMD STIMJ&PACG IV RX NFS: CPT | Mod: 26

## 2025-04-22 PROCEDURE — 93656 COMPRE EP EVAL ABLTJ ATR FIB: CPT

## 2025-04-22 RX ORDER — APIXABAN 2.5 MG/1
1 TABLET, FILM COATED ORAL
Qty: 0 | Refills: 0 | DISCHARGE

## 2025-04-22 NOTE — ASU DISCHARGE PLAN (ADULT/PEDIATRIC) - FINANCIAL ASSISTANCE
[facility] provides services at a reduced cost to those who are determined to be eligible through [facility]’s financial assistance program. Information regarding [facility]’s financial assistance program can be found by going to [email or web address] or by calling [phone].

## 2025-04-22 NOTE — PRE PROCEDURE NOTE - PRE PROCEDURE EVALUATION
70 y/o male with a PMHx of paroxysmal atrial fibrillation on Eliquis, HLD and BPH presents for elective atrial fibrillation ablation. H&P from PST reviewed. Medication reconciliation edited/updated where appropriate. Last dose of Eliquis was 4/21/2025 at 21:00. Pt admits to missing about 3-4 doses of Eliquis in the past month- Dr. Vigil aware. Last PO intake was 4/21/2025 at 18:00. No history of WALLY, dentures, or loose teeth. EKG reviewed. Procedure explained in detail. Risks/benefits discussed. All questions answered. Consent obtained.

## 2025-04-22 NOTE — CHART NOTE - NSCHARTNOTEFT_GEN_A_CORE
Pt s/p atrial fibrillation ablation, right femoral access. Regular Vascade used. As per Dr. Vigil, bedrest x 2 hours. No need for PPI. Resume Eliquis this evening. Plan for DC home after recovery completed.

## 2025-04-22 NOTE — ASU DISCHARGE PLAN (ADULT/PEDIATRIC) - CARE PROVIDER_API CALL
Vikram Vigil  Cardiovascular Disease  [id number] [address] [place] [address], [address]  [place] [address]  Phone: [phone]  Fax: [phone]  Scheduled Appointment: 05/16/2025 02:00 PM

## 2025-04-22 NOTE — ASU DISCHARGE PLAN (ADULT/PEDIATRIC) - ASU DC SPECIAL INSTRUCTIONSFT
Post-op ablation instruction has been verbally explained and given to the patient. Patient expressed understanding and all questions were answered   Patient is schedule for an appointment on 5/16/2025 at 2:00pm( 4th floor Oncology building [facility] [address], [place], [place], [address] [phone] )  Remove the bandage after 24 hours  No scrubbing the incision site for 7 days   No lifting more than 5lb or exertional exercising such as jogging, running, bike riding for 7 days  No swimming pool, Jacuzzi, or bath for 5 days.   Patient can shower 24 hours after procedure . Pat the area dry  Pt was instructed to call [phone] if the following occurs:      - fever with temperature > 101F      - swelling, drainage or bleeding at the site incision       - chest pain, SOB, n/v

## 2025-04-28 ENCOUNTER — TRANSCRIPTION ENCOUNTER (OUTPATIENT)
Age: 70
End: 2025-04-28

## 2025-04-29 ENCOUNTER — TRANSCRIPTION ENCOUNTER (OUTPATIENT)
Age: 70
End: 2025-04-29

## 2025-04-30 ENCOUNTER — RX RENEWAL (OUTPATIENT)
Age: 70
End: 2025-04-30

## 2025-05-12 ENCOUNTER — APPOINTMENT (OUTPATIENT)
Dept: UROLOGY | Facility: CLINIC | Age: 70
End: 2025-05-12
Payer: COMMERCIAL

## 2025-05-12 VITALS
OXYGEN SATURATION: 96 % | SYSTOLIC BLOOD PRESSURE: 134 MMHG | TEMPERATURE: 97.7 F | HEART RATE: 89 BPM | DIASTOLIC BLOOD PRESSURE: 80 MMHG

## 2025-05-12 DIAGNOSIS — N45.1 EPIDIDYMITIS: ICD-10-CM

## 2025-05-12 DIAGNOSIS — Z98.890 OTHER SPECIFIED POSTPROCEDURAL STATES: ICD-10-CM

## 2025-05-12 DIAGNOSIS — N28.1 CYST OF KIDNEY, ACQUIRED: ICD-10-CM

## 2025-05-12 DIAGNOSIS — Z87.438 PERSONAL HISTORY OF OTHER DISEASES OF MALE GENITAL ORGANS: ICD-10-CM

## 2025-05-12 DIAGNOSIS — I48.91 OTHER SPECIFIED POSTPROCEDURAL STATES: ICD-10-CM

## 2025-05-12 DIAGNOSIS — N48.6 INDURATION PENIS PLASTICA: ICD-10-CM

## 2025-05-12 DIAGNOSIS — Z12.5 ENCOUNTER FOR SCREENING FOR MALIGNANT NEOPLASM OF PROSTATE: ICD-10-CM
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
## 2025-05-12 PROBLEM — I48.0 PAROXYSMAL ATRIAL FIBRILLATION: Chronic | Status: ACTIVE | Noted: 2025-04-11

## 2025-05-12 PROBLEM — N40.0 BENIGN PROSTATIC HYPERPLASIA WITHOUT LOWER URINARY TRACT SYMPTOMS: Chronic | Status: ACTIVE | Noted: 2025-04-11

## 2025-05-12 PROCEDURE — 51741 ELECTRO-UROFLOWMETRY FIRST: CPT

## 2025-05-12 PROCEDURE — 51798 US URINE CAPACITY MEASURE: CPT

## 2025-05-12 PROCEDURE — 76705 ECHO EXAM OF ABDOMEN: CPT

## 2025-05-12 PROCEDURE — 99204 OFFICE O/P NEW MOD 45 MIN: CPT

## 2025-05-12 PROCEDURE — 99214 OFFICE O/P EST MOD 30 MIN: CPT

## 2025-05-13 ENCOUNTER — TRANSCRIPTION ENCOUNTER (OUTPATIENT)
Age: 70
End: 2025-05-13

## 2025-05-13 LAB
APPEARANCE: CLEAR
BACTERIA: NEGATIVE /HPF
BILIRUBIN URINE: NEGATIVE
BLOOD URINE: NEGATIVE
CAST: NORMAL /LPF
COLOR: YELLOW
EPITHELIAL CELLS: 0 /HPF
GLUCOSE QUALITATIVE U: NEGATIVE MG/DL
KETONES URINE: NEGATIVE MG/DL
LEUKOCYTE ESTERASE URINE: ABNORMAL
MICROSCOPIC-UA: NORMAL
MUCUS: PRESENT
NITRITE URINE: NEGATIVE
PH URINE: 5.5
PROTEIN URINE: NORMAL MG/DL
PSA SERPL-MCNC: 0.84 NG/ML
RED BLOOD CELLS URINE: NORMAL /HPF
REVIEW: NORMAL
SPECIFIC GRAVITY URINE: 1.02
UROBILINOGEN URINE: 1 MG/DL
WHITE BLOOD CELLS URINE: 0 /HPF

## 2025-05-16 ENCOUNTER — NON-APPOINTMENT (OUTPATIENT)
Age: 70
End: 2025-05-16

## 2025-05-16 ENCOUNTER — APPOINTMENT (OUTPATIENT)
Dept: ELECTROPHYSIOLOGY | Facility: CLINIC | Age: 70
End: 2025-05-16
Payer: COMMERCIAL

## 2025-05-16 VITALS
DIASTOLIC BLOOD PRESSURE: 66 MMHG | HEART RATE: 68 BPM | WEIGHT: 214 LBS | SYSTOLIC BLOOD PRESSURE: 101 MMHG | HEIGHT: 71 IN | OXYGEN SATURATION: 96 % | BODY MASS INDEX: 29.96 KG/M2

## 2025-05-16 DIAGNOSIS — Z98.890 OTHER SPECIFIED POSTPROCEDURAL STATES: ICD-10-CM

## 2025-05-16 DIAGNOSIS — Z86.79 OTHER SPECIFIED POSTPROCEDURAL STATES: ICD-10-CM

## 2025-05-16 DIAGNOSIS — E78.5 HYPERLIPIDEMIA, UNSPECIFIED: ICD-10-CM

## 2025-05-16 DIAGNOSIS — I48.0 PAROXYSMAL ATRIAL FIBRILLATION: ICD-10-CM

## 2025-05-16 PROCEDURE — 99213 OFFICE O/P EST LOW 20 MIN: CPT | Mod: 25

## 2025-05-16 PROCEDURE — 93000 ELECTROCARDIOGRAM COMPLETE: CPT

## 2025-07-11 ENCOUNTER — TRANSCRIPTION ENCOUNTER (OUTPATIENT)
Age: 70
End: 2025-07-11

## 2025-07-14 ENCOUNTER — TRANSCRIPTION ENCOUNTER (OUTPATIENT)
Age: 70
End: 2025-07-14

## 2025-08-01 ENCOUNTER — RX RENEWAL (OUTPATIENT)
Age: 70
End: 2025-08-01

## 2025-08-11 ENCOUNTER — APPOINTMENT (OUTPATIENT)
Dept: INTERNAL MEDICINE | Facility: CLINIC | Age: 70
End: 2025-08-11
Payer: COMMERCIAL

## 2025-08-11 VITALS
HEART RATE: 69 BPM | WEIGHT: 212 LBS | SYSTOLIC BLOOD PRESSURE: 110 MMHG | DIASTOLIC BLOOD PRESSURE: 70 MMHG | OXYGEN SATURATION: 99 % | HEIGHT: 70.5 IN | BODY MASS INDEX: 30.01 KG/M2

## 2025-08-11 DIAGNOSIS — Z00.00 ENCOUNTER FOR GENERAL ADULT MEDICAL EXAMINATION W/OUT ABNORMAL FINDINGS: ICD-10-CM

## 2025-08-11 DIAGNOSIS — E66.3 OVERWEIGHT: ICD-10-CM

## 2025-08-11 DIAGNOSIS — N28.1 CYST OF KIDNEY, ACQUIRED: ICD-10-CM

## 2025-08-11 PROCEDURE — 99387 INIT PM E/M NEW PAT 65+ YRS: CPT

## 2025-08-11 PROCEDURE — 36415 COLL VENOUS BLD VENIPUNCTURE: CPT

## 2025-08-12 LAB
25(OH)D3 SERPL-MCNC: 27.4 NG/ML
ALBUMIN SERPL ELPH-MCNC: 5 G/DL
ALP BLD-CCNC: 74 U/L
ALT SERPL-CCNC: 37 U/L
ANION GAP SERPL CALC-SCNC: 17 MMOL/L
APPEARANCE: CLEAR
AST SERPL-CCNC: 32 U/L
BACTERIA: NEGATIVE /HPF
BASOPHILS # BLD AUTO: 0.03 K/UL
BASOPHILS NFR BLD AUTO: 0.4 %
BILIRUB SERPL-MCNC: 1 MG/DL
BILIRUBIN URINE: NEGATIVE
BLOOD URINE: NEGATIVE
BUN SERPL-MCNC: 20 MG/DL
CALCIUM SERPL-MCNC: 9.3 MG/DL
CAST: 0 /LPF
CHLORIDE SERPL-SCNC: 106 MMOL/L
CHOLEST SERPL-MCNC: 169 MG/DL
CO2 SERPL-SCNC: 22 MMOL/L
COLOR: YELLOW
CREAT SERPL-MCNC: 1.13 MG/DL
EGFRCR SERPLBLD CKD-EPI 2021: 70 ML/MIN/1.73M2
EOSINOPHIL # BLD AUTO: 0.08 K/UL
EOSINOPHIL NFR BLD AUTO: 1.1 %
EPITHELIAL CELLS: 0 /HPF
ESTIMATED AVERAGE GLUCOSE: 114 MG/DL
GLUCOSE QUALITATIVE U: NEGATIVE MG/DL
GLUCOSE SERPL-MCNC: 103 MG/DL
HBA1C MFR BLD HPLC: 5.6 %
HCT VFR BLD CALC: 41.7 %
HDLC SERPL-MCNC: 42 MG/DL
HGB BLD-MCNC: 13.9 G/DL
IMM GRANULOCYTES NFR BLD AUTO: 0.3 %
KETONES URINE: NEGATIVE MG/DL
LDLC SERPL-MCNC: 101 MG/DL
LEUKOCYTE ESTERASE URINE: NEGATIVE
LYMPHOCYTES # BLD AUTO: 2.32 K/UL
LYMPHOCYTES NFR BLD AUTO: 30.8 %
MAN DIFF?: NORMAL
MCHC RBC-ENTMCNC: 31.2 PG
MCHC RBC-ENTMCNC: 33.3 G/DL
MCV RBC AUTO: 93.5 FL
MICROSCOPIC-UA: NORMAL
MONOCYTES # BLD AUTO: 0.74 K/UL
MONOCYTES NFR BLD AUTO: 9.8 %
NEUTROPHILS # BLD AUTO: 4.34 K/UL
NEUTROPHILS NFR BLD AUTO: 57.6 %
NITRITE URINE: NEGATIVE
NONHDLC SERPL-MCNC: 127 MG/DL
PH URINE: 5.5
PLATELET # BLD AUTO: 178 K/UL
POTASSIUM SERPL-SCNC: 4.1 MMOL/L
PROT SERPL-MCNC: 7.9 G/DL
PROTEIN URINE: NEGATIVE MG/DL
RBC # BLD: 4.46 M/UL
RBC # FLD: 12.8 %
RED BLOOD CELLS URINE: 0 /HPF
SODIUM SERPL-SCNC: 145 MMOL/L
SPECIFIC GRAVITY URINE: 1.02
T4 FREE SERPL-MCNC: 1 NG/DL
TRIGL SERPL-MCNC: 148 MG/DL
TSH SERPL-ACNC: 1.97 UIU/ML
UROBILINOGEN URINE: 1 MG/DL
VIT B12 SERPL-MCNC: 497 PG/ML
WBC # FLD AUTO: 7.53 K/UL
WHITE BLOOD CELLS URINE: 0 /HPF

## 2025-08-15 ENCOUNTER — APPOINTMENT (OUTPATIENT)
Dept: ELECTROPHYSIOLOGY | Facility: CLINIC | Age: 70
End: 2025-08-15
Payer: COMMERCIAL

## 2025-08-15 VITALS
BODY MASS INDEX: 30.44 KG/M2 | WEIGHT: 215 LBS | HEART RATE: 63 BPM | HEIGHT: 70.5 IN | DIASTOLIC BLOOD PRESSURE: 76 MMHG | SYSTOLIC BLOOD PRESSURE: 133 MMHG | OXYGEN SATURATION: 97 %

## 2025-08-15 DIAGNOSIS — I48.0 PAROXYSMAL ATRIAL FIBRILLATION: ICD-10-CM

## 2025-08-15 DIAGNOSIS — E78.5 HYPERLIPIDEMIA, UNSPECIFIED: ICD-10-CM

## 2025-08-15 PROCEDURE — 93000 ELECTROCARDIOGRAM COMPLETE: CPT

## 2025-08-15 PROCEDURE — 99214 OFFICE O/P EST MOD 30 MIN: CPT | Mod: 25

## 2025-08-20 ENCOUNTER — NON-APPOINTMENT (OUTPATIENT)
Age: 70
End: 2025-08-20

## 2025-08-21 ENCOUNTER — APPOINTMENT (OUTPATIENT)
Dept: DERMATOLOGY | Facility: CLINIC | Age: 70
End: 2025-08-21
Payer: COMMERCIAL

## 2025-08-21 DIAGNOSIS — L81.4 OTHER MELANIN HYPERPIGMENTATION: ICD-10-CM

## 2025-08-21 DIAGNOSIS — Z12.83 ENCOUNTER FOR SCREENING FOR MALIGNANT NEOPLASM OF SKIN: ICD-10-CM

## 2025-08-21 DIAGNOSIS — D22.9 MELANOCYTIC NEVI, UNSPECIFIED: ICD-10-CM

## 2025-08-21 PROCEDURE — 99203 OFFICE O/P NEW LOW 30 MIN: CPT
